# Patient Record
Sex: MALE | Race: WHITE | NOT HISPANIC OR LATINO | Employment: UNEMPLOYED | URBAN - METROPOLITAN AREA
[De-identification: names, ages, dates, MRNs, and addresses within clinical notes are randomized per-mention and may not be internally consistent; named-entity substitution may affect disease eponyms.]

---

## 2017-01-31 ENCOUNTER — OFFICE VISIT (OUTPATIENT)
Dept: INTERNAL MEDICINE | Facility: CLINIC | Age: 52
End: 2017-01-31

## 2017-01-31 ENCOUNTER — HOSPITAL ENCOUNTER (OUTPATIENT)
Dept: NUCLEAR MEDICINE | Facility: HOSPITAL | Age: 52
Discharge: HOME | End: 2017-01-31
Attending: INTERNAL MEDICINE

## 2017-01-31 ENCOUNTER — HOSPITAL ENCOUNTER (OUTPATIENT)
Dept: ULTRASOUND IMAGING | Facility: HOSPITAL | Age: 52
Discharge: HOME | End: 2017-01-31
Attending: INTERNAL MEDICINE | Admitting: INTERNAL MEDICINE

## 2017-01-31 VITALS
DIASTOLIC BLOOD PRESSURE: 86 MMHG | WEIGHT: 224.2 LBS | HEART RATE: 72 BPM | BODY MASS INDEX: 31.39 KG/M2 | SYSTOLIC BLOOD PRESSURE: 118 MMHG | HEIGHT: 71 IN

## 2017-01-31 DIAGNOSIS — R10.11 RIGHT UPPER QUADRANT ABDOMINAL PAIN: ICD-10-CM

## 2017-01-31 DIAGNOSIS — K81.0 ACUTE CHOLECYSTITIS: Primary | ICD-10-CM

## 2017-01-31 DIAGNOSIS — R10.11 ABDOMINAL PAIN, ACUTE, RIGHT UPPER QUADRANT: Primary | ICD-10-CM

## 2017-01-31 DIAGNOSIS — R10.11 RIGHT UPPER QUADRANT ABDOMINAL PAIN: Primary | ICD-10-CM

## 2017-01-31 LAB
ALBUMIN SERPL-MCNC: 4.07 G/DL (ref 3.4–4.6)
ALBUMIN/GLOB SERPL: 1.2 G/DL
ALP SERPL-CCNC: 100 U/L (ref 46–116)
ALT SERPL W P-5'-P-CCNC: 54 U/L (ref 16–63)
ANION GAP SERPL CALCULATED.3IONS-SCNC: 7 MMOL/L
AST SERPL-CCNC: 24 U/L (ref 7–37)
BASOPHILS # BLD AUTO: 0.05 10*3/MM3 (ref 0–0.2)
BASOPHILS NFR BLD AUTO: 0.5 % (ref 0–2)
BILIRUB SERPL-MCNC: 0.5 MG/DL (ref 0.2–1)
BUN BLD-MCNC: 17 MG/DL (ref 6–22)
BUN/CREAT SERPL: 13.2 (ref 7–25)
CALCIUM SPEC-SCNC: 9.3 MG/DL (ref 8.6–10.5)
CHLORIDE SERPL-SCNC: 101 MMOL/L (ref 95–107)
CO2 SERPL-SCNC: 32 MMOL/L (ref 23–32)
CREAT BLD-MCNC: 1.29 MG/DL (ref 0.7–1.3)
DEPRECATED RDW RBC AUTO: 40.5 FL (ref 37–54)
EOSINOPHIL # BLD AUTO: 0.12 10*3/MM3 (ref 0–0.7)
EOSINOPHIL NFR BLD AUTO: 1.3 % (ref 0–5)
ERYTHROCYTE [DISTWIDTH] IN BLOOD BY AUTOMATED COUNT: 12.7 % (ref 11.5–15)
GFR SERPL CREATININE-BSD FRML MDRD: 58 ML/MIN/1.73
GLOBULIN UR ELPH-MCNC: 3.4 GM/DL
GLUCOSE BLD-MCNC: 100 MG/DL (ref 70–100)
HCT VFR BLD AUTO: 49.5 % (ref 40.1–51)
HGB BLD-MCNC: 16.2 G/DL (ref 13.7–17.5)
LIPASE SERPL-CCNC: 23 U/L (ref 13–60)
LYMPHOCYTES # BLD AUTO: 2.95 10*3/MM3 (ref 0.8–7)
LYMPHOCYTES NFR BLD AUTO: 30.9 % (ref 10–60)
MCH RBC QN AUTO: 28.6 PG (ref 26–34)
MCHC RBC AUTO-ENTMCNC: 32.7 G/DL (ref 31–37)
MCV RBC AUTO: 87.5 FL (ref 80–100)
MONOCYTES # BLD AUTO: 0.83 10*3/MM3 (ref 0–1)
MONOCYTES NFR BLD AUTO: 8.7 % (ref 0–13)
NEUTROPHILS # BLD AUTO: 5.59 10*3/MM3 (ref 1–11)
NEUTROPHILS NFR BLD AUTO: 58.6 % (ref 30–85)
PLATELET # BLD AUTO: 389 10*3/MM3 (ref 150–450)
PMV BLD AUTO: 9 FL (ref 6–12)
POTASSIUM BLD-SCNC: 5.3 MMOL/L (ref 3.3–5.3)
PROT SERPL-MCNC: 7.5 G/DL (ref 6.3–8.4)
RBC # BLD AUTO: 5.66 10*6/MM3 (ref 4.63–6.08)
SODIUM BLD-SCNC: 140 MMOL/L (ref 136–145)
WBC NRBC COR # BLD: 9.54 10*3/MM3 (ref 5–10)

## 2017-01-31 PROCEDURE — 25010000002 SINCALIDE PER 5 MCG: Performed by: INTERNAL MEDICINE

## 2017-01-31 PROCEDURE — A9537 TC99M MEBROFENIN: HCPCS | Performed by: INTERNAL MEDICINE

## 2017-01-31 PROCEDURE — 0 TECHNETIUM TC 99M MEBROFENIN KIT: Performed by: INTERNAL MEDICINE

## 2017-01-31 PROCEDURE — 80053 COMPREHEN METABOLIC PANEL: CPT | Performed by: INTERNAL MEDICINE

## 2017-01-31 PROCEDURE — 78227 HEPATOBIL SYST IMAGE W/DRUG: CPT

## 2017-01-31 PROCEDURE — 36415 COLL VENOUS BLD VENIPUNCTURE: CPT | Performed by: INTERNAL MEDICINE

## 2017-01-31 PROCEDURE — 85025 COMPLETE CBC W/AUTO DIFF WBC: CPT | Performed by: INTERNAL MEDICINE

## 2017-01-31 PROCEDURE — 99213 OFFICE O/P EST LOW 20 MIN: CPT | Performed by: INTERNAL MEDICINE

## 2017-01-31 PROCEDURE — 76700 US EXAM ABDOM COMPLETE: CPT

## 2017-01-31 RX ORDER — KIT FOR THE PREPARATION OF TECHNETIUM TC 99M MEBROFENIN 45 MG/10ML
1 INJECTION, POWDER, LYOPHILIZED, FOR SOLUTION INTRAVENOUS
Status: COMPLETED | OUTPATIENT
Start: 2017-01-31 | End: 2017-01-31

## 2017-01-31 RX ADMIN — SINCALIDE 2 MCG: 5 INJECTION, POWDER, LYOPHILIZED, FOR SOLUTION INTRAVENOUS at 14:30

## 2017-01-31 RX ADMIN — MEBROFENIN 1 DOSE: 45 INJECTION, POWDER, LYOPHILIZED, FOR SOLUTION INTRAVENOUS at 13:30

## 2017-01-31 NOTE — MR AVS SNAPSHOT
Walter Fish   2017 8:40 AM   Office Visit    Dept Phone:  [phone]   Encounter #:  [id number]    Provider:  Gui Bloom Jr., MD   Department:  [facility] INTERNAL MEDICINE                Your Full Care Plan              Your Updated Medication List          This list is accurate as of: 17  8:59 AM.  Always use your most recent med list.                amLODIPine 2.5 MG tablet   Commonly known as:  NORVASC   Take 1 tablet by mouth daily.       lansoprazole 30 MG capsule   Commonly known as:  PREVACID   Take 1 capsule by mouth daily.       LORazepam 2 MG tablet   Commonly known as:  ATIVAN   Take 1 tablet by mouth 2 (Two) Times a Day.       losartan 100 MG tablet   Commonly known as:  COZAAR   Take 1 tablet by mouth daily.               We Performed the Following     US Abdomen Complete       You Were Diagnosed With        Codes Comments    Abdominal pain, acute, right upper quadrant    -  Primary ICD-10-CM: R10.11  ICD-9-CM: 789.01, 338.19       Instructions     None    Patient Instructions History      Upcoming Appointments     Visit Type Date Time Department    OFFICE VISIT 2017  8:40 AM [facility]    OFFICE VISIT 2017  9:20 AM [facility]    OFFICE VISIT 2017  8:40 AM [email or web address] Signup     [facility] [employer] allows you to send messages to your doctor, view your test results, renew your prescriptions, schedule appointments, and more. To sign up, go to [email or web address] and click on the Sign Up Now link in the New User? box. Enter your [employer] Activation Code exactly as it appears below along with the last four digits of your Social Security Number and your Date of Birth () to complete the sign-up process. If you do not sign up before the expiration date, you must request a new code.    [employer] Activation Code: [email or web address]  Expires: 2017  8:59 AM    If you have questions, you can email  [email or web address] Messenger or call [phone] to talk to our MyChart staff. Remember, MyChart is NOT to be used for urgent needs. For medical emergencies, dial 911.               Other Info from Your Visit           Your Appointments     Feb 08, 2017  9:20 AM EST   Office Visit with Gui Bloom Jr., MD   [facility] INTERNAL MEDICINE (--)    [address] [place] [id number] [phone]           Arrive 15 minutes prior to appointment.            Feb 24, 2017  8:40 AM EST   Office Visit with Gui Bloom Jr., MD   [facility] INTERNAL MEDICINE (--)    [address] [place] [id number] [phone]           Arrive 15 minutes prior to appointment.              Allergies     No Known Allergies      Reason for Visit     Back Pain x 2 wks    Abdominal Pain x 2 days      Vital Signs     Blood Pressure Pulse Height Weight Body Mass Index Smoking Status    118/86 72 71\" (180.3 cm) 224 lb 3.2 oz (102 kg) 31.27 kg/m2 Never Smoker      Problems and Diagnoses Noted     Abdominal pain, acute, right upper quadrant        "

## 2017-01-31 NOTE — PROGRESS NOTES
Subjective   Walter Fish is a 52 y.o. male.     History of Present Illness he is here today for the evaluation of upper abdominal pain which began 3 nights ago.  He awoke from sleep with upper abdominal pain with radiation to the right upper quadrant.  He has had intermittent pain since. There was no associated nausea or vomiting.  He has had some constipation recently though he did have for some loose stools the day after onset of symptoms.  There has been no melanotic stool or rectal bleeding.  He does relate that he has been using Pepcid AC more frequently over the last 6 months for epigastric area discomfort.  Presently he is on omeprazole for GERD.        Review of Systems   Constitutional: Negative for activity change, appetite change and fever.   HENT: Negative for trouble swallowing.    Respiratory: Negative for cough, chest tightness, shortness of breath and wheezing.    Cardiovascular: Negative for chest pain, palpitations and leg swelling.   Gastrointestinal: Positive for abdominal pain, constipation and diarrhea. Negative for blood in stool, nausea and vomiting.   Genitourinary: Negative for dysuria, flank pain and hematuria.   Musculoskeletal: Positive for back pain. Negative for arthralgias and gait problem.   Neurological: Negative for dizziness, weakness and headaches.       Objective   Physical Exam   Constitutional: He is oriented to person, place, and time. Vital signs are normal. He appears well-developed and well-nourished. He is active. He does not appear ill.   Eyes: Conjunctivae are normal.   Cardiovascular: Normal rate, regular rhythm, S1 normal and S2 normal.  Exam reveals no S3 and no S4.    No murmur heard.  Pulmonary/Chest: No tachypnea. No respiratory distress. He has no decreased breath sounds. He has no wheezes. He has no rhonchi. He has no rales.   Abdominal: Soft. Normal appearance and bowel sounds are normal. He exhibits no abdominal bruit and no mass. There is no  hepatosplenomegaly. There is no tenderness.   Musculoskeletal:        Arms:  Neurological: He is alert and oriented to person, place, and time. Gait normal.   Psychiatric: He has a normal mood and affect. His speech is normal and behavior is normal. Judgment and thought content normal. Cognition and memory are normal.       Assessment/Plan assessment #1 acute abdominal pain-question acute cholecystitis versus peptic.  This was discussed with the patient.    Plan #1 gallbladder ultrasound now #2 CBC, CMP, lipase now.

## 2017-02-02 DIAGNOSIS — K81.9 CHOLECYSTITIS: Primary | ICD-10-CM

## 2017-02-03 ENCOUNTER — ANESTHESIA (OUTPATIENT)
Dept: PERIOP | Facility: HOSPITAL | Age: 52
End: 2017-02-03

## 2017-02-03 ENCOUNTER — HOSPITAL ENCOUNTER (OUTPATIENT)
Facility: HOSPITAL | Age: 52
Setting detail: HOSPITAL OUTPATIENT SURGERY
Discharge: HOME | End: 2017-02-03
Attending: SURGERY | Admitting: SURGERY

## 2017-02-03 ENCOUNTER — ANESTHESIA EVENT (OUTPATIENT)
Dept: PERIOP | Facility: HOSPITAL | Age: 52
End: 2017-02-03

## 2017-02-03 VITALS
TEMPERATURE: 98 F | DIASTOLIC BLOOD PRESSURE: 87 MMHG | OXYGEN SATURATION: 100 % | HEART RATE: 74 BPM | SYSTOLIC BLOOD PRESSURE: 131 MMHG | RESPIRATION RATE: 16 BRPM

## 2017-02-03 DIAGNOSIS — K81.9 CHOLECYSTITIS: ICD-10-CM

## 2017-02-03 PROBLEM — R10.11 ABDOMINAL PAIN, ACUTE, RIGHT UPPER QUADRANT: Status: RESOLVED | Noted: 2017-01-31 | Resolved: 2017-02-03

## 2017-02-03 RX ORDER — SODIUM CHLORIDE 0.9 % (FLUSH) 0.9 %
1-10 SYRINGE (ML) INJECTION AS NEEDED
Status: DISCONTINUED | OUTPATIENT
Start: 2017-02-03 | End: 2017-02-03 | Stop reason: HOSPADM

## 2017-02-03 RX ORDER — METOCLOPRAMIDE HYDROCHLORIDE 5 MG/ML
10 INJECTION INTRAMUSCULAR; INTRAVENOUS ONCE
Status: COMPLETED | OUTPATIENT
Start: 2017-02-03 | End: 2017-02-03

## 2017-02-03 RX ORDER — PROMETHAZINE HYDROCHLORIDE 25 MG/1
25 SUPPOSITORY RECTAL ONCE AS NEEDED
Status: COMPLETED | OUTPATIENT
Start: 2017-02-03 | End: 2017-02-03

## 2017-02-03 RX ORDER — OXYCODONE HYDROCHLORIDE AND ACETAMINOPHEN 5; 325 MG/1; MG/1
2 TABLET ORAL ONCE AS NEEDED
Status: DISCONTINUED | OUTPATIENT
Start: 2017-02-03 | End: 2017-02-03 | Stop reason: HOSPADM

## 2017-02-03 RX ORDER — FENTANYL CITRATE 50 UG/ML
INJECTION, SOLUTION INTRAMUSCULAR; INTRAVENOUS AS NEEDED
Status: DISCONTINUED | OUTPATIENT
Start: 2017-02-03 | End: 2017-02-03 | Stop reason: SURG

## 2017-02-03 RX ORDER — NALBUPHINE HCL 10 MG/ML
2 AMPUL (ML) INJECTION EVERY 4 HOURS PRN
Status: DISCONTINUED | OUTPATIENT
Start: 2017-02-03 | End: 2017-02-03 | Stop reason: HOSPADM

## 2017-02-03 RX ORDER — LABETALOL HYDROCHLORIDE 5 MG/ML
5 INJECTION, SOLUTION INTRAVENOUS
Status: DISCONTINUED | OUTPATIENT
Start: 2017-02-03 | End: 2017-02-03 | Stop reason: HOSPADM

## 2017-02-03 RX ORDER — KETOROLAC TROMETHAMINE 30 MG/ML
30 INJECTION, SOLUTION INTRAMUSCULAR; INTRAVENOUS ONCE
Status: COMPLETED | OUTPATIENT
Start: 2017-02-03 | End: 2017-02-03

## 2017-02-03 RX ORDER — FENTANYL CITRATE 50 UG/ML
50 INJECTION, SOLUTION INTRAMUSCULAR; INTRAVENOUS
Status: DISCONTINUED | OUTPATIENT
Start: 2017-02-03 | End: 2017-02-03 | Stop reason: HOSPADM

## 2017-02-03 RX ORDER — OXYCODONE HYDROCHLORIDE AND ACETAMINOPHEN 5; 325 MG/1; MG/1
2 TABLET ORAL EVERY 4 HOURS PRN
Qty: 30 TABLET | Refills: 0 | Status: SHIPPED | OUTPATIENT
Start: 2017-02-03 | End: 2017-02-24

## 2017-02-03 RX ORDER — MAGNESIUM HYDROXIDE 1200 MG/15ML
LIQUID ORAL AS NEEDED
Status: DISCONTINUED | OUTPATIENT
Start: 2017-02-03 | End: 2017-02-03 | Stop reason: HOSPADM

## 2017-02-03 RX ORDER — DIPHENHYDRAMINE HYDROCHLORIDE 50 MG/ML
6.25 INJECTION INTRAMUSCULAR; INTRAVENOUS
Status: DISCONTINUED | OUTPATIENT
Start: 2017-02-03 | End: 2017-02-03 | Stop reason: HOSPADM

## 2017-02-03 RX ORDER — PROMETHAZINE HYDROCHLORIDE 25 MG/ML
6.25 INJECTION, SOLUTION INTRAMUSCULAR; INTRAVENOUS ONCE AS NEEDED
Status: COMPLETED | OUTPATIENT
Start: 2017-02-03 | End: 2017-02-03

## 2017-02-03 RX ORDER — FAMOTIDINE 10 MG/ML
20 INJECTION, SOLUTION INTRAVENOUS ONCE
Status: COMPLETED | OUTPATIENT
Start: 2017-02-03 | End: 2017-02-03

## 2017-02-03 RX ORDER — ONDANSETRON 2 MG/ML
4 INJECTION INTRAMUSCULAR; INTRAVENOUS ONCE AS NEEDED
Status: DISCONTINUED | OUTPATIENT
Start: 2017-02-03 | End: 2017-02-03 | Stop reason: HOSPADM

## 2017-02-03 RX ORDER — HYDRALAZINE HYDROCHLORIDE 20 MG/ML
5 INJECTION INTRAMUSCULAR; INTRAVENOUS
Status: DISCONTINUED | OUTPATIENT
Start: 2017-02-03 | End: 2017-02-03 | Stop reason: HOSPADM

## 2017-02-03 RX ORDER — MIDAZOLAM HYDROCHLORIDE 1 MG/ML
2 INJECTION INTRAMUSCULAR; INTRAVENOUS
Status: DISCONTINUED | OUTPATIENT
Start: 2017-02-03 | End: 2017-02-03 | Stop reason: HOSPADM

## 2017-02-03 RX ORDER — BUPIVACAINE HYDROCHLORIDE AND EPINEPHRINE 2.5; 5 MG/ML; UG/ML
INJECTION, SOLUTION INFILTRATION; PERINEURAL AS NEEDED
Status: DISCONTINUED | OUTPATIENT
Start: 2017-02-03 | End: 2017-02-03 | Stop reason: HOSPADM

## 2017-02-03 RX ORDER — ONDANSETRON 2 MG/ML
INJECTION INTRAMUSCULAR; INTRAVENOUS AS NEEDED
Status: DISCONTINUED | OUTPATIENT
Start: 2017-02-03 | End: 2017-02-03 | Stop reason: SURG

## 2017-02-03 RX ORDER — LIDOCAINE HYDROCHLORIDE 20 MG/ML
INJECTION, SOLUTION INFILTRATION; PERINEURAL AS NEEDED
Status: DISCONTINUED | OUTPATIENT
Start: 2017-02-03 | End: 2017-02-03 | Stop reason: SURG

## 2017-02-03 RX ORDER — SODIUM CHLORIDE, SODIUM LACTATE, POTASSIUM CHLORIDE, CALCIUM CHLORIDE 600; 310; 30; 20 MG/100ML; MG/100ML; MG/100ML; MG/100ML
9 INJECTION, SOLUTION INTRAVENOUS CONTINUOUS
Status: DISCONTINUED | OUTPATIENT
Start: 2017-02-03 | End: 2017-02-03 | Stop reason: HOSPADM

## 2017-02-03 RX ORDER — GLYCOPYRROLATE 0.2 MG/ML
INJECTION INTRAMUSCULAR; INTRAVENOUS AS NEEDED
Status: DISCONTINUED | OUTPATIENT
Start: 2017-02-03 | End: 2017-02-03 | Stop reason: SURG

## 2017-02-03 RX ORDER — HYDROMORPHONE HYDROCHLORIDE 1 MG/ML
0.5 INJECTION, SOLUTION INTRAMUSCULAR; INTRAVENOUS; SUBCUTANEOUS
Status: DISCONTINUED | OUTPATIENT
Start: 2017-02-03 | End: 2017-02-03 | Stop reason: HOSPADM

## 2017-02-03 RX ORDER — PROMETHAZINE HYDROCHLORIDE 25 MG/1
25 TABLET ORAL ONCE AS NEEDED
Status: COMPLETED | OUTPATIENT
Start: 2017-02-03 | End: 2017-02-03

## 2017-02-03 RX ORDER — NALOXONE HCL 0.4 MG/ML
0.4 VIAL (ML) INJECTION AS NEEDED
Status: DISCONTINUED | OUTPATIENT
Start: 2017-02-03 | End: 2017-02-03 | Stop reason: HOSPADM

## 2017-02-03 RX ORDER — PROPOFOL 10 MG/ML
VIAL (ML) INTRAVENOUS AS NEEDED
Status: DISCONTINUED | OUTPATIENT
Start: 2017-02-03 | End: 2017-02-03 | Stop reason: SURG

## 2017-02-03 RX ORDER — NALBUPHINE HCL 10 MG/ML
10 AMPUL (ML) INJECTION EVERY 4 HOURS PRN
Status: DISCONTINUED | OUTPATIENT
Start: 2017-02-03 | End: 2017-02-03 | Stop reason: HOSPADM

## 2017-02-03 RX ORDER — ACETAMINOPHEN 325 MG/1
650 TABLET ORAL ONCE
Status: COMPLETED | OUTPATIENT
Start: 2017-02-03 | End: 2017-02-03

## 2017-02-03 RX ORDER — DIPHENHYDRAMINE HYDROCHLORIDE 50 MG/ML
12.5 INJECTION INTRAMUSCULAR; INTRAVENOUS
Status: DISCONTINUED | OUTPATIENT
Start: 2017-02-03 | End: 2017-02-03 | Stop reason: HOSPADM

## 2017-02-03 RX ORDER — NEOSTIGMINE METHYLSULFATE 1 MG/ML
INJECTION, SOLUTION INTRAVENOUS AS NEEDED
Status: DISCONTINUED | OUTPATIENT
Start: 2017-02-03 | End: 2017-02-03 | Stop reason: SURG

## 2017-02-03 RX ORDER — HYDROCODONE BITARTRATE AND ACETAMINOPHEN 5; 325 MG/1; MG/1
1 TABLET ORAL ONCE AS NEEDED
Status: DISCONTINUED | OUTPATIENT
Start: 2017-02-03 | End: 2017-02-03 | Stop reason: HOSPADM

## 2017-02-03 RX ORDER — SODIUM CHLORIDE 9 MG/ML
INJECTION, SOLUTION INTRAVENOUS AS NEEDED
Status: DISCONTINUED | OUTPATIENT
Start: 2017-02-03 | End: 2017-02-03 | Stop reason: HOSPADM

## 2017-02-03 RX ORDER — MIDAZOLAM HYDROCHLORIDE 1 MG/ML
1 INJECTION INTRAMUSCULAR; INTRAVENOUS
Status: DISCONTINUED | OUTPATIENT
Start: 2017-02-03 | End: 2017-02-03 | Stop reason: HOSPADM

## 2017-02-03 RX ORDER — ROCURONIUM BROMIDE 10 MG/ML
INJECTION, SOLUTION INTRAVENOUS AS NEEDED
Status: DISCONTINUED | OUTPATIENT
Start: 2017-02-03 | End: 2017-02-03 | Stop reason: SURG

## 2017-02-03 RX ORDER — PROMETHAZINE HCL 50 MG
25 TABLET ORAL EVERY 6 HOURS PRN
Qty: 12 TABLET | Refills: 0 | Status: SHIPPED | OUTPATIENT
Start: 2017-02-03 | End: 2017-02-24

## 2017-02-03 RX ORDER — LIDOCAINE HYDROCHLORIDE 10 MG/ML
5 INJECTION, SOLUTION EPIDURAL; INFILTRATION; INTRACAUDAL; PERINEURAL ONCE
Status: DISCONTINUED | OUTPATIENT
Start: 2017-02-03 | End: 2017-02-03 | Stop reason: HOSPADM

## 2017-02-03 RX ORDER — KETOROLAC TROMETHAMINE 30 MG/ML
INJECTION, SOLUTION INTRAMUSCULAR; INTRAVENOUS AS NEEDED
Status: DISCONTINUED | OUTPATIENT
Start: 2017-02-03 | End: 2017-02-03 | Stop reason: SURG

## 2017-02-03 RX ADMIN — HYDROCODONE BITARTRATE AND ACETAMINOPHEN 1 TABLET: 5; 325 TABLET ORAL at 14:02

## 2017-02-03 RX ADMIN — GLYCOPYRROLATE 0.6 MG: 0.2 INJECTION INTRAMUSCULAR; INTRAVENOUS at 13:13

## 2017-02-03 RX ADMIN — MIDAZOLAM 2 MG: 1 INJECTION INTRAMUSCULAR; INTRAVENOUS at 11:52

## 2017-02-03 RX ADMIN — FENTANYL CITRATE 50 MCG: 50 INJECTION INTRAMUSCULAR; INTRAVENOUS at 12:48

## 2017-02-03 RX ADMIN — LIDOCAINE HYDROCHLORIDE 60 MG: 20 INJECTION, SOLUTION INFILTRATION; PERINEURAL at 12:49

## 2017-02-03 RX ADMIN — FENTANYL CITRATE 50 MCG: 50 INJECTION INTRAMUSCULAR; INTRAVENOUS at 13:06

## 2017-02-03 RX ADMIN — SODIUM CHLORIDE, POTASSIUM CHLORIDE, SODIUM LACTATE AND CALCIUM CHLORIDE 9 ML/HR: 600; 310; 30; 20 INJECTION, SOLUTION INTRAVENOUS at 11:32

## 2017-02-03 RX ADMIN — ACETAMINOPHEN 650 MG: 325 TABLET ORAL at 11:50

## 2017-02-03 RX ADMIN — FENTANYL CITRATE 50 MCG: 50 INJECTION INTRAMUSCULAR; INTRAVENOUS at 13:09

## 2017-02-03 RX ADMIN — METOCLOPRAMIDE 10 MG: 5 INJECTION, SOLUTION INTRAMUSCULAR; INTRAVENOUS at 14:01

## 2017-02-03 RX ADMIN — KETOROLAC TROMETHAMINE 30 MG: 30 INJECTION, SOLUTION INTRAMUSCULAR; INTRAVENOUS at 13:05

## 2017-02-03 RX ADMIN — FENTANYL CITRATE 50 MCG: 50 INJECTION INTRAMUSCULAR; INTRAVENOUS at 13:18

## 2017-02-03 RX ADMIN — ONDANSETRON 4 MG: 2 INJECTION INTRAMUSCULAR; INTRAVENOUS at 13:05

## 2017-02-03 RX ADMIN — SODIUM CHLORIDE, POTASSIUM CHLORIDE, SODIUM LACTATE AND CALCIUM CHLORIDE: 600; 310; 30; 20 INJECTION, SOLUTION INTRAVENOUS at 13:08

## 2017-02-03 RX ADMIN — PROPOFOL 200 MG: 10 INJECTION, EMULSION INTRAVENOUS at 12:49

## 2017-02-03 RX ADMIN — FAMOTIDINE 20 MG: 10 INJECTION, SOLUTION INTRAVENOUS at 11:52

## 2017-02-03 RX ADMIN — KETOROLAC TROMETHAMINE 30 MG: 30 INJECTION, SOLUTION INTRAMUSCULAR at 14:02

## 2017-02-03 RX ADMIN — NEOSTIGMINE METHYLSULFATE 4 MG: 1 INJECTION INTRAVENOUS at 13:13

## 2017-02-03 RX ADMIN — PROMETHAZINE HYDROCHLORIDE 25 MG: 25 TABLET ORAL at 14:48

## 2017-02-03 RX ADMIN — ROCURONIUM BROMIDE 25 MG: 10 INJECTION INTRAVENOUS at 12:49

## 2017-02-03 NOTE — OP NOTE
February 3, 2017    Walter Fish  [id number]    PROCEDURE:  Laparoscopic cholecystectomy.    PREOPERATIVE DIAGNOSIS:  chronic choleyctitis with bilary dyskinesia.    POSTOPERATIVE DIAGNOSIS:  same.     SURGEON:  Dakota Mckeon M.D.    ASSISTANT:  None.    ANESTHESIA:  GETA.    FINDINGS:  Moderate pericholecystic adhesions consisting of omentum densely attached about one half way up the gallbladder from West's Pouch.    INDICATIONS:  Patient presents today with a recently diagnosed chronic choleyctitis with bilary dyskinesia. After seeing the patient and reviewing the studies, I recommended consideration for laparoscopic cholecystectomy .  The procedure, risks, benefits and alternatives were discussed including but not limited to bleeding, infection, opening, internal organ injury as well as the need for further surgery acutely or chronically down the line.  They expressed agreement and understanding.  Questions were answered to their satisfaction.    PROCEDURE:  Patient was taken to the operating room and placed supine on the operating room table. After establishment of adequate general endotracheal anesthesia, the abdomen was prepped and draped in normal sterile fashion.  The procedure was then initiated by making a 5 mm transverse infraumbilical incision through which a bladeless was trocar was inserted into the abdominal cavity.  After insufflation a camera was inserted and the abdomen cavity was inspected.  With no contraindications, I then placed under direct vision 3 subcostal trochars, an 11 mm subxiphoid trocar, and 2 lateral 5 mm trochars. Two blunt gallbladder graspers were then inserted and the gallbladder was dissected free and elevated.  I grabbed the fundus first and retracted superiorly and the West's pouch and retracted laterally.  This opened up the triangle of Calot.  Both blunt and sharp dissection were then used to define the length of the gallbladder, the West's pouch and the  triangle of Calot.  Once in the triangle, I then dissected out the cystic duct and artery.  Both were identified, completely cleared, clipped and divided.  With both structures divided and no other structures identified, and the anatomic relationships confirmed, I then began removing the gallbladder from its hepatic attachments using cautery and countertraction.  Once the gallbladder was completely  from the liver bed, it was then removed through the subxiphoid port without much difficulty.  The right upper quadrant was then irrigated with saline and inspected.  Once no evidence of continued bleeding or signs of bile leak were noted, instruments and trocars were removed and counted correctly.  Local anesthetic solution was injected into all the incisions which were then closed with 4-0 Vicryl subcuticular suture and then covered by steristrips and Band-Aids.  Anesthesia was reversed and the patient was taken recovery in satisfactory condition.    Dakota Mckeon M.D.

## 2017-02-03 NOTE — PLAN OF CARE
Problem: Patient Care Overview (Adult)  Goal: Plan of Care Review  Outcome: Ongoing (interventions implemented as appropriate)    02/03/17 1343   Coping/Psychosocial Response Interventions   Plan Of Care Reviewed With patient   Patient Care Overview   Progress improving

## 2017-02-03 NOTE — PLAN OF CARE
Problem: Patient Care Overview (Adult)  Goal: Plan of Care Review  Outcome: Ongoing (interventions implemented as appropriate)    02/03/17 1109   Coping/Psychosocial Response Interventions   Plan Of Care Reviewed With patient       Goal: Discharge Needs Assessment  Outcome: Ongoing (interventions implemented as appropriate)    02/03/17 1109   Discharge Needs Assessment   Concerns To Be Addressed denies needs/concerns at this time

## 2017-02-03 NOTE — ANESTHESIA PREPROCEDURE EVALUATION
Anesthesia Evaluation      Airway   Mallampati: II  TM distance: >3 FB  Neck ROM: full  no difficulty expected  Dental - normal exam     Pulmonary - negative pulmonary ROS and normal exam   Cardiovascular   (+) hypertension,     Rhythm: regular    Neuro/Psych  (+) psychiatric history,    GI/Hepatic/Renal/Endo    (+)  GERD,     Musculoskeletal     Abdominal    Substance History      OB/GYN          Other                             Anesthesia Plan    ASA 2     general   (  D/W R&B of GA including but not limited to: heart, lung, liver, kidney, neurologic problems, positioning injuries, dental damage, corneal abrasion and TMJ.  .)  intravenous induction

## 2017-02-03 NOTE — ANESTHESIA PROCEDURE NOTES
Airway  Urgency: elective    Date/Time: 2/3/2017 12:53 PM  Airway not difficult    General Information and Staff    Patient location during procedure: OR  Anesthesiologist: KINGA CHOUDHURY  CRNA: OREN PEREZ    Indications and Patient Condition  Indications for airway management: airway protection    Preoxygenated: yes  Mask difficulty assessment: 1 - vent by mask    Final Airway Details  Final airway type: endotracheal airway      Successful airway: ETT  Cuffed: yes   Successful intubation technique: direct laryngoscopy  Facilitating devices/methods: intubating stylet  Endotracheal tube insertion site: oral  Blade: Nadir  Blade size: #4  ETT size: 7.5 mm  Cormack-Lehane Classification: grade IIa - partial view of glottis  Placement verified by: chest auscultation and capnometry   Measured from: lips  ETT to lips (cm): 22  Number of attempts at approach: 1    Additional Comments  Pre 02, sivi,, easy bvm, dlx1, grade 2b view, intubation x 1 attempt, appears atraumatic, teeth unchanged

## 2017-02-03 NOTE — PLAN OF CARE
Problem: Patient Care Overview (Adult)  Goal: Plan of Care Review  Outcome: Outcome(s) achieved Date Met:  02/03/17 02/03/17 9252   Coping/Psychosocial Response Interventions   Plan Of Care Reviewed With patient;spouse   Patient Care Overview   Progress improving

## 2017-02-03 NOTE — PLAN OF CARE
Problem: Perioperative Period (Adult)  Goal: Signs and Symptoms of Listed Potential Problems Will be Absent or Manageable (Perioperative Period)  Outcome: Outcome(s) achieved Date Met:  02/03/17 02/03/17 1438   Perioperative Period   Problems Assessed (Perioperative Period) all   Problems Present (Perioperative Period) none

## 2017-02-03 NOTE — ANESTHESIA POSTPROCEDURE EVALUATION
Patient: Walter Fish    Procedure Summary     Date Anesthesia Start Anesthesia Stop Room / Location    02/03/17 1247 1320  [facility]  /  [facility]       Procedure Diagnosis Surgeon Provider    CHOLECYSTECTOMY LAPAROSCOPIC (N/A Abdomen) Cholecystitis  (Cholecystitis [K81.9]) MD Elieser Willard MD          Anesthesia Type: general  Last vitals  /87 (02/03/17 1430)    Temp 36.7 °C (98 °F) (02/03/17 1400)    Pulse 74 (02/03/17 1430)   Resp 16 (02/03/17 1430)    SpO2 100 % (02/03/17 1430)      Post Anesthesia Care and Evaluation    Patient location during evaluation: bedside  Patient participation: complete - patient participated  Level of consciousness: awake and alert  Pain management: adequate  Airway patency: patent  Anesthetic complications: No anesthetic complications  PONV status: some mild nassea.  Cardiovascular status: acceptable  Respiratory status: acceptable  Hydration status: acceptable

## 2017-02-03 NOTE — PLAN OF CARE
Problem: Perioperative Period (Adult)  Goal: Signs and Symptoms of Listed Potential Problems Will be Absent or Manageable (Perioperative Period)  Outcome: Ongoing (interventions implemented as appropriate)    02/03/17 1109   Perioperative Period   Problems Assessed (Perioperative Period) all

## 2017-02-03 NOTE — PLAN OF CARE
Problem: Perioperative Period (Adult)  Goal: Signs and Symptoms of Listed Potential Problems Will be Absent or Manageable (Perioperative Period)  Outcome: Ongoing (interventions implemented as appropriate)    02/03/17 1344   Perioperative Period   Problems Assessed (Perioperative Period) all   Problems Present (Perioperative Period) pain

## 2017-02-03 NOTE — H&P
Office H & P    LEO TSE  YOB: 1965  DATE OF SERVICE:  02/02/2017        CONSULTING PHYSICIAN:  Dakota Mckeon MD    REQUESTING PHYSICIAN:  Gui Bloom MD    REASON FOR CONSULTATION:  Cholecystitis.    HISTORY:  The patient is a 52-year-old young man, who had a screening colonoscopy by me in the past, and comes in today to discuss a poorly functioning gallbladder.  He thinks he probably had stomach issues going on two years, including epigastric pain after eating certain foods that has become more right upper quadrant pain and now radiates to the back.  Mild reflux has turned into more severe reflux with nausea.  It all seems to be related to eating, but now even not eating he is still uncomfortable.  He denies any change in the color of his urine or stool or blood from above or below.  He does not have any significant relief with reflux medication.  He had an ultrasound of the gallbladder that was negative, but then a HIDA scan was done that showed only an 8% gallbladder function.  He was referred to me.    PAST MEDICAL HISTORY:  1.  High blood pressure.  2.  Seasonal allergies.  3.  Several joint surgeries.  4.  Reflux.    SOCIAL HISTORY:  .  Works for [employer].  Does not drink or smoke.    FAMILY HISTORY:  Gallbladder disease in both parents.    REVIEW OF SYSTEMS:  A 12-point review of systems reveals multiple GI complaints as mentioned above.  Otherwise, all systems negative.    PHYSICAL EXAMINATION:  GENERAL:  Healthy appearing for his age, but slightly uncomfortable appearing young man in no acute distress, alert, oriented, and afebrile.  HEENT:  Head normocephalic.  External ears normal.  Sclerae nonicteric.  NECK:  Supple without thyromegaly.  CHEST:  Clear with good respiratory effort bilaterally.  HEART:  Regular without murmur.  ABDOMEN:  Soft without masses, hernias, distention, or tympany, but he is tender in the right upper quadrant.  EXTREMITIES:  Without cyanosis  or edema.  SKIN:  Negative.  RECTAL:  Deferred.    IMPRESSION:  Chronic cholecystitis with severe biliary dyskinesia.    PLAN:  Laparoscopic cholecystectomy and he would like that as soon as possible, so I will try to get it on for tomorrow and he is agreeable and anxious to proceed.    Letter to Gui Bloom.          GAGAN Stroud: cecily/nancy  02/02/2017 00:00:00  02/03/2017 00:00:00

## 2017-02-03 NOTE — PLAN OF CARE
Problem: Patient Care Overview (Adult)  Goal: Discharge Needs Assessment  Outcome: Outcome(s) achieved Date Met:  02/03/17 02/03/17 1438   Discharge Needs Assessment   Concerns To Be Addressed no discharge needs identified   Discharge Disposition home or self-care

## 2017-02-04 LAB
CYTO UR: NORMAL
LAB AP CASE REPORT: NORMAL
Lab: NORMAL
PATH REPORT.FINAL DX SPEC: NORMAL
PATH REPORT.GROSS SPEC: NORMAL

## 2017-02-22 ENCOUNTER — TELEPHONE (OUTPATIENT)
Dept: INTERNAL MEDICINE | Facility: CLINIC | Age: 52
End: 2017-02-22

## 2017-02-22 NOTE — TELEPHONE ENCOUNTER
----- Message from Africa Abdi MA sent at 2/22/2017  9:04 AM EST -----  Contact: pt      ----- Message -----     From: Damion Serrano     Sent: 2/22/2017   8:28 AM       To: Africa Abdi MA    Pt would like to know if he still needs to come in for his 6 mo follow up on Friday. Pt was in on 1/31/17. Please advise.     #[phone]

## 2017-02-24 ENCOUNTER — OFFICE VISIT (OUTPATIENT)
Dept: INTERNAL MEDICINE | Facility: CLINIC | Age: 52
End: 2017-02-24

## 2017-02-24 VITALS
BODY MASS INDEX: 31.08 KG/M2 | DIASTOLIC BLOOD PRESSURE: 90 MMHG | HEART RATE: 84 BPM | SYSTOLIC BLOOD PRESSURE: 124 MMHG | HEIGHT: 71 IN | WEIGHT: 222 LBS

## 2017-02-24 DIAGNOSIS — I10 ESSENTIAL HYPERTENSION: ICD-10-CM

## 2017-02-24 DIAGNOSIS — E78.00 ELEVATED CHOLESTEROL: ICD-10-CM

## 2017-02-24 DIAGNOSIS — Z90.49 STATUS POST CHOLECYSTECTOMY: Primary | ICD-10-CM

## 2017-02-24 LAB
CHOLEST SERPL-MCNC: 174 MG/DL (ref 0–200)
HDLC SERPL-MCNC: 47 MG/DL (ref 40–81)
LDLC SERPL CALC-MCNC: 115 MG/DL (ref 0–100)
LDLC/HDLC SERPL: 2.45 {RATIO}
TRIGL SERPL-MCNC: 59 MG/DL (ref 0–150)
VLDLC SERPL-MCNC: 11.8 MG/DL

## 2017-02-24 PROCEDURE — 99213 OFFICE O/P EST LOW 20 MIN: CPT | Performed by: INTERNAL MEDICINE

## 2017-02-24 PROCEDURE — 80061 LIPID PANEL: CPT | Performed by: INTERNAL MEDICINE

## 2017-02-24 PROCEDURE — 36415 COLL VENOUS BLD VENIPUNCTURE: CPT | Performed by: INTERNAL MEDICINE

## 2017-03-01 RX ORDER — LOSARTAN POTASSIUM 100 MG/1
100 TABLET ORAL DAILY
Qty: 90 TABLET | Refills: 3 | Status: SHIPPED | OUTPATIENT
Start: 2017-03-01 | End: 2018-02-15 | Stop reason: SDUPTHER

## 2017-03-01 RX ORDER — AMLODIPINE BESYLATE 2.5 MG/1
2.5 TABLET ORAL DAILY
Qty: 90 TABLET | Refills: 3 | Status: SHIPPED | OUTPATIENT
Start: 2017-03-01 | End: 2017-03-21 | Stop reason: DRUGHIGH

## 2017-03-01 RX ORDER — LANSOPRAZOLE 30 MG/1
30 CAPSULE, DELAYED RELEASE ORAL DAILY
Qty: 90 CAPSULE | Refills: 3 | Status: SHIPPED | OUTPATIENT
Start: 2017-03-01 | End: 2018-02-15 | Stop reason: SDUPTHER

## 2017-03-02 DIAGNOSIS — F41.9 ANXIETY: Primary | ICD-10-CM

## 2017-03-02 RX ORDER — LORAZEPAM 2 MG/1
2 TABLET ORAL 2 TIMES DAILY
Qty: 180 TABLET | Refills: 0 | OUTPATIENT
Start: 2017-03-02

## 2017-03-02 NOTE — TELEPHONE ENCOUNTER
----- Message from Aicha Jack sent at 3/2/2017 10:03 AM EST -----  Contact: pts wife Aysha  Pt wife calling for refill on LORazepam (ATIVAN) 2 MG tablet 180 tablet      Sig - Route: Take 1 tablet by mouth 2 (Two) Times a Day. - Oral  Please send [place] [facility] [id number] [place] [address] [facility] [phone] [facility] [phone] FX

## 2017-03-14 ENCOUNTER — TELEPHONE (OUTPATIENT)
Dept: INTERNAL MEDICINE | Facility: CLINIC | Age: 52
End: 2017-03-14

## 2017-03-14 RX ORDER — LORAZEPAM 2 MG/1
2 TABLET ORAL 2 TIMES DAILY
Qty: 180 TABLET | Refills: 0 | OUTPATIENT
Start: 2017-03-14 | End: 2017-07-11 | Stop reason: SDUPTHER

## 2017-03-14 NOTE — TELEPHONE ENCOUNTER
----- Message from Walter Salazarevelia sent at 3/13/2017  9:52 PM EDT -----  Regarding: Prescription Question  Contact: [phone]  My blood pressure spiked yesterday to 140/100.  I am not sure why.  I had my Healthcare screening for work Saturday and I was at 121/80.  I started to feel dizzy today and I felt off balance.  I took 2 extra amlopidine  tabs tonight to help bring me back in line.  I have been under a lot of stress for a while but I haven't felt like this.  I am feeling some better since taking the extra medicine.  I was a little scared after watching mom go through her stroke.  Please let me know if I should increase my meds or come in to see you.      Thanks for all you do.    Regards,        Cole Fish

## 2017-03-14 NOTE — TELEPHONE ENCOUNTER
----- Message from Walter Fish sent at 3/13/2017  9:54 PM EDT -----  Regarding: Prescription Question  Contact: [phone]  Please call my ativan prescription into [facility] this week.  It is for 90 days at 2 MG twice daily.      Regards,      Cole Fish

## 2017-03-20 ENCOUNTER — OFFICE VISIT (OUTPATIENT)
Dept: INTERNAL MEDICINE | Facility: CLINIC | Age: 52
End: 2017-03-20

## 2017-03-20 VITALS
HEIGHT: 71 IN | WEIGHT: 225 LBS | SYSTOLIC BLOOD PRESSURE: 134 MMHG | DIASTOLIC BLOOD PRESSURE: 90 MMHG | BODY MASS INDEX: 31.5 KG/M2

## 2017-03-20 DIAGNOSIS — I10 ESSENTIAL HYPERTENSION: Primary | ICD-10-CM

## 2017-03-20 PROCEDURE — 99213 OFFICE O/P EST LOW 20 MIN: CPT | Performed by: INTERNAL MEDICINE

## 2017-03-20 RX ORDER — METOPROLOL SUCCINATE 50 MG/1
50 TABLET, EXTENDED RELEASE ORAL DAILY
Qty: 90 TABLET | Refills: 3 | Status: SHIPPED | OUTPATIENT
Start: 2017-03-20 | End: 2017-09-08

## 2017-03-20 NOTE — PROGRESS NOTES
Subjective   Walter Fish is a 52 y.o. male.     History of Present Illness he is here today for recent increase in bp which had previously been controlled. Last week he began to notice bp 130-140/100. He has increased his norvasc to 10mg per day.        Review of Systems   Constitutional: Negative for activity change, appetite change, fatigue and fever.   Respiratory: Negative for cough, chest tightness and wheezing.    Cardiovascular: Negative for chest pain and palpitations.   Gastrointestinal: Negative for abdominal pain, diarrhea, nausea and vomiting.   Neurological: Positive for dizziness and headaches. Negative for speech difficulty, weakness and numbness.   Psychiatric/Behavioral: Negative for confusion and dysphoric mood. The patient is not nervous/anxious.        Objective   Physical Exam   Constitutional: He is oriented to person, place, and time. He appears well-developed and well-nourished. He is active. He does not appear ill.   Eyes: Conjunctivae are normal.   Cardiovascular: Normal rate, regular rhythm, S1 normal and S2 normal.  Exam reveals no S3 and no S4.    No murmur heard.  Pulmonary/Chest: No tachypnea. No respiratory distress. He has no decreased breath sounds. He has no wheezes. He has no rhonchi. He has no rales.   Abdominal: Soft. Normal appearance and bowel sounds are normal. He exhibits no abdominal bruit and no mass. There is no hepatosplenomegaly. There is no tenderness.       Vascular Status -  His exam exhibits no right foot edema. His exam exhibits no left foot edema.  Neurological: He is alert and oriented to person, place, and time.   Psychiatric: He has a normal mood and affect. His speech is normal and behavior is normal. Judgment and thought content normal. Cognition and memory are normal.       Assessment/Plan bp 122/99    Assessment #1 hypertension- difficult to explain sudden poorer control     Plan#1 continue losartan 100mg qd, norvasc 10mg qd. #2 begin metoprolol 50mg qd.   Fu with me in one month.

## 2017-03-21 ENCOUNTER — TELEPHONE (OUTPATIENT)
Dept: INTERNAL MEDICINE | Facility: CLINIC | Age: 52
End: 2017-03-21

## 2017-03-21 RX ORDER — AMLODIPINE BESYLATE 10 MG/1
10 TABLET ORAL DAILY
Qty: 90 TABLET | Refills: 3 | Status: SHIPPED | OUTPATIENT
Start: 2017-03-21 | End: 2017-04-21 | Stop reason: DRUGHIGH

## 2017-03-21 NOTE — TELEPHONE ENCOUNTER
Dr. Bloom,    Mr. Fish sent a My Chart message (see below) stating that he was to have had a rx for  One (1) Amlodipine 10 mg sent in to his local pharmacy.    I see where there was a 90 day supply w/ 3 additional refills of Amlodipine 2.5 mg sent in on 3/1/17 which is what I had informed him.    Please advise.    Thank you,

## 2017-03-21 NOTE — TELEPHONE ENCOUNTER
----- Message from Walter Fish sent at 3/21/2017  9:28 AM EDT -----  Regarding: RE: Prescription Question  Contact: [phone]  He said he was calling in 1 10mg tablet.  Did yhat change?  ----- Message -----  From: JENS RIVERA  Sent: 3/21/2017  8:31 AM EDT  To: Walter Fish  Subject: RE: Prescription Question    Mr. Fish,    A refill on your Amlodipine 2.5 mg was sent to your [facility] pharmacy in [place] on 3/1/17 for 90 tablets with 3 additional refills. Please contact your pharmacy to see if they have refill ready for .    Thank you,    Malik DE LA ROSA     [facility]      ----- Message -----     From: Walter Fish     Sent: 3/20/2017  7:10 PM EDT       To: Gui Bloom Jr., MD  Subject: Prescription Question    Please call on my 10 MG amlopidine.  I am running out of 2.5 mg.    Regards,      Cole Fish

## 2017-04-21 ENCOUNTER — OFFICE VISIT (OUTPATIENT)
Dept: INTERNAL MEDICINE | Facility: CLINIC | Age: 52
End: 2017-04-21

## 2017-04-21 VITALS
SYSTOLIC BLOOD PRESSURE: 136 MMHG | WEIGHT: 226 LBS | DIASTOLIC BLOOD PRESSURE: 90 MMHG | HEIGHT: 71 IN | BODY MASS INDEX: 31.64 KG/M2

## 2017-04-21 DIAGNOSIS — E78.00 ELEVATED CHOLESTEROL: Primary | ICD-10-CM

## 2017-04-21 DIAGNOSIS — I10 ESSENTIAL HYPERTENSION: ICD-10-CM

## 2017-04-21 PROCEDURE — 99213 OFFICE O/P EST LOW 20 MIN: CPT | Performed by: INTERNAL MEDICINE

## 2017-04-21 RX ORDER — AMLODIPINE BESYLATE 2.5 MG/1
2.5 TABLET ORAL DAILY
Qty: 90 TABLET | Refills: 3 | Status: SHIPPED | OUTPATIENT
Start: 2017-04-21 | End: 2017-07-10 | Stop reason: SDUPTHER

## 2017-04-21 NOTE — PROGRESS NOTES
Subjective   Walter Fish is a 52 y.o. male.     History of Present Illness he is here today for follow-up of hypertension.  Not unexpectedly he found that the amount of antihypertensives medication he was taking to cover his sudden increase in blood pressure actually dropped his blood pressure to the point where he was dizzy and fatigued.  Therefore he decreased his Norvasc from 10 mg to 2.5 mg less than a week ago.  His blood pressure has been running 120s over 80 generally since that time.  His sense of fatigue and dizziness have improved.        Review of Systems   Constitutional: Negative for activity change, appetite change and fatigue.   Respiratory: Negative for chest tightness and shortness of breath.    Musculoskeletal: Negative for gait problem.   Neurological: Negative for dizziness, weakness and headaches.       Objective   Physical Exam   Constitutional: He is oriented to person, place, and time. He appears well-developed and well-nourished. He is active. He does not appear ill.   Cardiovascular: Normal rate, regular rhythm, S1 normal and S2 normal.  Exam reveals no S3 and no S4.    No murmur heard.  Pulmonary/Chest: No tachypnea. No respiratory distress. He has no decreased breath sounds. He has no wheezes. He has no rhonchi. He has no rales.   Abdominal: Soft. Normal appearance and bowel sounds are normal. He exhibits no abdominal bruit and no mass. There is no hepatosplenomegaly. There is no tenderness.       Vascular Status -  His exam exhibits no right foot edema. His exam exhibits no left foot edema.  Neurological: He is alert and oriented to person, place, and time. Gait normal.   Psychiatric: He has a normal mood and affect. His speech is normal and behavior is normal. Judgment and thought content normal. Cognition and memory are normal.       Assessment/Plan resting pulse is 76.  Total cholesterol 174 triglycerides 59 HDL cholesterol 47 LDL cholesterol 115.    Assessment #1  hypertension-appropriate control on present regimen of Norvasc 2.5 mg, losartan 100 mg, metoprolol ER 50 mg daily.  #2 hypercholesterolemia-good control and without sign of target organ injury    Plan #1 no change medication.  Routine follow-up with me in 4 months.

## 2017-05-30 ENCOUNTER — TELEPHONE (OUTPATIENT)
Dept: INTERNAL MEDICINE | Facility: CLINIC | Age: 52
End: 2017-05-30

## 2017-07-10 ENCOUNTER — TELEPHONE (OUTPATIENT)
Dept: INTERNAL MEDICINE | Facility: CLINIC | Age: 52
End: 2017-07-10

## 2017-07-10 RX ORDER — AMLODIPINE BESYLATE 2.5 MG/1
2.5 TABLET ORAL DAILY
Qty: 90 TABLET | Refills: 3 | Status: SHIPPED | OUTPATIENT
Start: 2017-07-10 | End: 2017-07-13

## 2017-07-10 NOTE — TELEPHONE ENCOUNTER
----- Message from Aide Fall MA sent at 7/10/2017  9:00 AM EDT -----  Pt calling for refill reflecting new dose increase     Amlodipine 5mg    [phone]

## 2017-07-11 ENCOUNTER — PATIENT MESSAGE (OUTPATIENT)
Dept: INTERNAL MEDICINE | Facility: CLINIC | Age: 52
End: 2017-07-11

## 2017-07-11 RX ORDER — LORAZEPAM 2 MG/1
2-4 TABLET ORAL 3 TIMES DAILY PRN
Qty: 150 TABLET | Refills: 0 | Status: SHIPPED | OUTPATIENT
Start: 2017-07-11 | End: 2017-09-08 | Stop reason: SDUPTHER

## 2017-07-11 RX ORDER — LORAZEPAM 2 MG/1
2 TABLET ORAL 2 TIMES DAILY
Qty: 150 TABLET | Refills: 3 | Status: SHIPPED | OUTPATIENT
Start: 2017-07-11 | End: 2017-07-11 | Stop reason: SDUPTHER

## 2017-07-11 RX ORDER — LORAZEPAM 2 MG/1
2 TABLET ORAL 2 TIMES DAILY
Qty: 150 TABLET | Refills: 3 | OUTPATIENT
Start: 2017-07-11 | End: 2017-07-11 | Stop reason: SDUPTHER

## 2017-07-11 NOTE — TELEPHONE ENCOUNTER
From: Walter Fish  To: Gui Bloom Jr., MD  Sent: 7/11/2017 10:51 AM EDT  Subject: Non-Urgent Medical Question    Did you call in my 5 mg blood pressure med as well? I am out if 2.5 mg.

## 2017-07-11 NOTE — PROGRESS NOTES
Dr. Bloom made updates on Mr. Fish's prescription.  I went into the chart and updated there as well.  That is why it made changes to it.

## 2017-07-11 NOTE — TELEPHONE ENCOUNTER
----- Message from Damion Serrano sent at 7/10/2017  9:28 AM EDT -----  Contact: pt  Pt calling again and would like a refill on RX sent into Pharmacy. Please advise.          LORazepam (ATIVAN) 2 MG tablet    He just realized he has no more refills.     Send to :       [facility]/pharmacy #[id number] - [place], [place] - [address] AT [facility] - [phone] [facility] [phone] FX     Pt#

## 2017-07-13 ENCOUNTER — DOCUMENTATION (OUTPATIENT)
Dept: INTERNAL MEDICINE | Facility: CLINIC | Age: 52
End: 2017-07-13

## 2017-07-13 ENCOUNTER — TELEPHONE (OUTPATIENT)
Dept: INTERNAL MEDICINE | Facility: CLINIC | Age: 52
End: 2017-07-13

## 2017-07-13 RX ORDER — AMLODIPINE BESYLATE 10 MG/1
10 TABLET ORAL DAILY
Qty: 90 TABLET | Refills: 3 | Status: SHIPPED | OUTPATIENT
Start: 2017-07-13 | End: 2018-05-29 | Stop reason: SDUPTHER

## 2017-07-13 NOTE — TELEPHONE ENCOUNTER
Zehra called Pt and talked to him about his med. Rx called in to [facility] pharmacy ,its Norvasc 5 mg by mouth daily, #90 refill ×3.

## 2017-07-13 NOTE — TELEPHONE ENCOUNTER
----- Message from Damion Serrano sent at 7/13/2017 12:44 PM EDT -----  Contact: pt  Pt calling again. He says that Dr. Bloom had changed his amlodipine to 5 mg. He says that 10 mg is wrong. He is upset. He says that Dr. Bloom changed it to 5 mg. Please advise.     Amlodipine 5mg       [facility]/pharmacy #[id number] - [facility], [place] - [address] AT [facility] [phone] [facility] [phone]     Pt would like you to call him. When it is done.

## 2017-07-13 NOTE — TELEPHONE ENCOUNTER
I was looking through his chart.  On 03/20/2017  Dr. Bloom stated Mr. Fish should be taking 10 MG of Norvasc.      Then on 04/21/2017  Dr. Bloom stated patient should be taking 2.5MG of Norvasc.  So that was called in to the pharmacy.      Patient called and stated he should be on 5 MG.  However I do not see anything in the notes that states that.        Please advise on correct dose.

## 2017-07-13 NOTE — TELEPHONE ENCOUNTER
After looking at Mr. Fish's chart he should be taking 10 MG.  I resent the prescription for the correct dose.

## 2017-07-13 NOTE — TELEPHONE ENCOUNTER
----- Message from rBielle Garcia sent at 7/13/2017 10:44 AM EDT -----  Contact: pt - Dr Bloom's pt - RE: corrected Rx  Pt calling and would like a return call regarding pt's Rx. Pt would like a corrected Rx called to pt's pharmacy? Pt informs that Rx was called in incorrectly. Pt informs that this happens every time. Pt informs that Dr Bloom changed dosage. Could you please call pt to discuss? Please advise. Thanks    amLODIPine (NORVASC) 2.5 MG tablet 90 tablet 3 7/10/2017      Sig - Route: Take 1 tablet by mouth Daily. - Oral      [id number] [place] - [address] AT [facility] - [phone]  - [phone]  [phone] (Phone)  [phone] (Fax)    Pt # [phone]

## 2017-09-08 ENCOUNTER — OFFICE VISIT (OUTPATIENT)
Dept: INTERNAL MEDICINE | Facility: CLINIC | Age: 52
End: 2017-09-08

## 2017-09-08 VITALS
SYSTOLIC BLOOD PRESSURE: 124 MMHG | HEART RATE: 76 BPM | HEIGHT: 71 IN | DIASTOLIC BLOOD PRESSURE: 84 MMHG | BODY MASS INDEX: 30.94 KG/M2 | WEIGHT: 221 LBS

## 2017-09-08 DIAGNOSIS — E78.00 ELEVATED CHOLESTEROL: Primary | ICD-10-CM

## 2017-09-08 DIAGNOSIS — I10 ESSENTIAL HYPERTENSION: ICD-10-CM

## 2017-09-08 LAB
BUN SERPL-MCNC: 14 MG/DL (ref 6–20)
BUN/CREAT SERPL: 12.3 (ref 7–25)
CALCIUM SERPL-MCNC: 9.5 MG/DL (ref 8.6–10.5)
CHLORIDE SERPL-SCNC: 101 MMOL/L (ref 98–107)
CHOLEST SERPL-MCNC: 183 MG/DL (ref 0–200)
CO2 SERPL-SCNC: 26.2 MMOL/L (ref 22–29)
CREAT SERPL-MCNC: 1.14 MG/DL (ref 0.76–1.27)
GLUCOSE SERPL-MCNC: 85 MG/DL (ref 65–99)
HDLC SERPL-MCNC: 46 MG/DL (ref 40–60)
LDLC SERPL CALC-MCNC: 120 MG/DL (ref 0–100)
POTASSIUM SERPL-SCNC: 4.8 MMOL/L (ref 3.5–5.2)
SODIUM SERPL-SCNC: 141 MMOL/L (ref 136–145)
TRIGL SERPL-MCNC: 85 MG/DL (ref 0–150)
VLDLC SERPL-MCNC: 17 MG/DL (ref 5–40)

## 2017-09-08 PROCEDURE — 99213 OFFICE O/P EST LOW 20 MIN: CPT | Performed by: INTERNAL MEDICINE

## 2017-09-08 RX ORDER — LORAZEPAM 2 MG/1
2 TABLET ORAL NIGHTLY PRN
Qty: 90 TABLET | Refills: 1 | OUTPATIENT
Start: 2017-09-08 | End: 2018-02-18 | Stop reason: ALTCHOICE

## 2017-09-08 NOTE — PROGRESS NOTES
Subjective   Walter Fish is a 52 y.o. male.     History of Present Illness for follow-up of hypertension and hypercholesterolemia.  Presently he is on Norvasc 10 mg daily and losartan 100 mg daily with good control of his blood pressure.  He uses Ativan at night for sleep.  He denies any dizziness or focal neurologic symptoms.  He has not had any dyspnea on exertion, chest pain, or PND.        Review of Systems   Constitutional: Negative for activity change, appetite change and fatigue.   Respiratory: Negative for cough, chest tightness, shortness of breath and wheezing.    Cardiovascular: Negative for chest pain, palpitations and leg swelling.   Gastrointestinal: Negative for abdominal pain, diarrhea, nausea and vomiting.   Genitourinary: Negative for dysuria, flank pain, frequency and hematuria.   Neurological: Negative for dizziness, syncope, facial asymmetry, speech difficulty, weakness and headaches.   Psychiatric/Behavioral: Negative for confusion and dysphoric mood. The patient is nervous/anxious.        Objective   Physical Exam   Constitutional: He is oriented to person, place, and time. Vital signs are normal. He appears well-developed and well-nourished. He is active. He does not appear ill.   Eyes: Conjunctivae are normal.   Neck: Carotid bruit is not present.   Cardiovascular: Normal rate, regular rhythm and S1 normal.  Exam reveals no S3 and no S4.    No murmur heard.  Pulses:       Posterior tibial pulses are 2+ on the right side, and 2+ on the left side.   Pulmonary/Chest: No tachypnea. No respiratory distress. He has no decreased breath sounds. He has no wheezes. He has no rhonchi. He has no rales.   Abdominal: Soft. Normal appearance and bowel sounds are normal. He exhibits no abdominal bruit and no mass. There is no hepatosplenomegaly. There is no tenderness.       Vascular Status -  His exam exhibits no right foot edema. His exam exhibits no left foot edema.  Neurological: He is alert and  oriented to person, place, and time. Gait normal.   Psychiatric: He has a normal mood and affect. His speech is normal and behavior is normal. Judgment and thought content normal. Cognition and memory are normal.       Assessment/Plan assessment #1 hypertension-good control on present regimen #2 hypercholesterolemia-thus far controlled with diet and no sign of target organ injury    Plan #1 no change medication #2 lipids and BMP today.  Routine follow-up with me in 6 months.

## 2017-09-12 ENCOUNTER — TELEPHONE (OUTPATIENT)
Dept: INTERNAL MEDICINE | Facility: CLINIC | Age: 52
End: 2017-09-12

## 2017-09-12 NOTE — TELEPHONE ENCOUNTER
----- Message from Walter Fish sent at 9/11/2017 10:10 PM EDT -----  Regarding: Prescription Question  Contact: [phone]  I was taking Ativan 2m twice daily but you prescribed 2m once a day.  It is not safe to reduce this medicine like that.  Can cause seizures and other issues.  Why does our prescription keep getting messed up.  The paper I took home said 2m 2 to 3 times per day.  We need to get the meds under control or I am going to need to find a new doctor.  This has been going in for over a year now.      Cole

## 2017-10-18 ENCOUNTER — TELEPHONE (OUTPATIENT)
Dept: INTERNAL MEDICINE | Facility: CLINIC | Age: 52
End: 2017-10-18

## 2017-10-18 NOTE — TELEPHONE ENCOUNTER
----- Message from Walter Fish sent at 10/17/2017  8:04 PM EDT -----  Regarding: RE: RE: Prescription Question  Contact: [phone]  I am running out of Ativan pills.  He cut my script back to 1  2mg per day but I was taking 2 mg 2 to 3 times per day.  [facility].      Augustina Galvan      ----- Message -----  From: JENS JOYCE  Sent: 9/12/17 12:14 PM  To: Walter Fish  Subject: RE: Prescription Question    Per Dr. Bloom,    The prescription filled is always based on the consumption since the prior refill.          ----- Message -----     From: Walter Fish     Sent: 9/11/2017 10:10 PM EDT       To: Gui Bloom Jr., MD  Subject: Prescription Question    I was taking Ativan 2m twice daily but you prescribed 2m once a day.  It is not safe to reduce this medicine like that.  Can cause seizures and other issues.  Why does our prescription keep getting messed up.  The paper I took home said 2m 2 to 3 times per day.  We need to get the meds under control or I am going to need to find a new doctor.  This has been going in for over a year now.      Cole

## 2017-12-17 ENCOUNTER — HOSPITAL ENCOUNTER (EMERGENCY)
Facility: HOSPITAL | Age: 52
Discharge: HOME | End: 2017-12-18
Attending: EMERGENCY MEDICINE | Admitting: EMERGENCY MEDICINE

## 2017-12-17 DIAGNOSIS — R11.2 NAUSEA VOMITING AND DIARRHEA: Primary | ICD-10-CM

## 2017-12-17 DIAGNOSIS — R19.7 NAUSEA VOMITING AND DIARRHEA: Primary | ICD-10-CM

## 2017-12-17 DIAGNOSIS — A09 GASTROENTERITIS, INFECTIOUS: ICD-10-CM

## 2017-12-17 LAB
ALBUMIN SERPL-MCNC: 4.2 G/DL (ref 3.5–5.2)
ALBUMIN/GLOB SERPL: 1.2 G/DL
ALP SERPL-CCNC: 97 U/L (ref 39–117)
ALT SERPL W P-5'-P-CCNC: 34 U/L (ref 1–41)
ANION GAP SERPL CALCULATED.3IONS-SCNC: 17.3 MMOL/L
AST SERPL-CCNC: 20 U/L (ref 1–40)
BASOPHILS # BLD AUTO: 0.02 10*3/MM3 (ref 0–0.2)
BASOPHILS NFR BLD AUTO: 0.1 % (ref 0–1.5)
BILIRUB SERPL-MCNC: 0.7 MG/DL (ref 0.1–1.2)
BILIRUB UR QL STRIP: NEGATIVE
BUN BLD-MCNC: 21 MG/DL (ref 6–20)
BUN/CREAT SERPL: 15.9 (ref 7–25)
CALCIUM SPEC-SCNC: 8.9 MG/DL (ref 8.6–10.5)
CHLORIDE SERPL-SCNC: 103 MMOL/L (ref 98–107)
CLARITY UR: CLEAR
CO2 SERPL-SCNC: 20.7 MMOL/L (ref 22–29)
COLOR UR: ABNORMAL
CREAT BLD-MCNC: 1.32 MG/DL (ref 0.76–1.27)
D-LACTATE SERPL-SCNC: 1.7 MMOL/L (ref 0.5–2)
DEPRECATED RDW RBC AUTO: 43.4 FL (ref 37–54)
EOSINOPHIL # BLD AUTO: 0 10*3/MM3 (ref 0–0.7)
EOSINOPHIL NFR BLD AUTO: 0 % (ref 0.3–6.2)
ERYTHROCYTE [DISTWIDTH] IN BLOOD BY AUTOMATED COUNT: 13.2 % (ref 11.5–14.5)
GFR SERPL CREATININE-BSD FRML MDRD: 57 ML/MIN/1.73
GLOBULIN UR ELPH-MCNC: 3.4 GM/DL
GLUCOSE BLD-MCNC: 126 MG/DL (ref 65–99)
GLUCOSE UR STRIP-MCNC: NEGATIVE MG/DL
HCT VFR BLD AUTO: 49.8 % (ref 40.4–52.2)
HGB BLD-MCNC: 16.6 G/DL (ref 13.7–17.6)
HGB UR QL STRIP.AUTO: NEGATIVE
IMM GRANULOCYTES # BLD: 0.08 10*3/MM3 (ref 0–0.03)
IMM GRANULOCYTES NFR BLD: 0.5 % (ref 0–0.5)
KETONES UR QL STRIP: NEGATIVE
LEUKOCYTE ESTERASE UR QL STRIP.AUTO: NEGATIVE
LIPASE SERPL-CCNC: 13 U/L (ref 13–60)
LYMPHOCYTES # BLD AUTO: 0.74 10*3/MM3 (ref 0.9–4.8)
LYMPHOCYTES NFR BLD AUTO: 4.8 % (ref 19.6–45.3)
MCH RBC QN AUTO: 30.1 PG (ref 27–32.7)
MCHC RBC AUTO-ENTMCNC: 33.3 G/DL (ref 32.6–36.4)
MCV RBC AUTO: 90.4 FL (ref 79.8–96.2)
MONOCYTES # BLD AUTO: 1.25 10*3/MM3 (ref 0.2–1.2)
MONOCYTES NFR BLD AUTO: 8.1 % (ref 5–12)
NEUTROPHILS # BLD AUTO: 13.4 10*3/MM3 (ref 1.9–8.1)
NEUTROPHILS NFR BLD AUTO: 86.5 % (ref 42.7–76)
NITRITE UR QL STRIP: NEGATIVE
PH UR STRIP.AUTO: 5.5 [PH] (ref 5–8)
PLATELET # BLD AUTO: 313 10*3/MM3 (ref 140–500)
PMV BLD AUTO: 9.6 FL (ref 6–12)
POTASSIUM BLD-SCNC: 3.8 MMOL/L (ref 3.5–5.2)
PROT SERPL-MCNC: 7.6 G/DL (ref 6–8.5)
PROT UR QL STRIP: ABNORMAL
RBC # BLD AUTO: 5.51 10*6/MM3 (ref 4.6–6)
SODIUM BLD-SCNC: 141 MMOL/L (ref 136–145)
SP GR UR STRIP: 1.03 (ref 1–1.03)
UROBILINOGEN UR QL STRIP: ABNORMAL
WBC NRBC COR # BLD: 15.49 10*3/MM3 (ref 4.5–10.7)

## 2017-12-17 PROCEDURE — 96361 HYDRATE IV INFUSION ADD-ON: CPT

## 2017-12-17 PROCEDURE — 83605 ASSAY OF LACTIC ACID: CPT | Performed by: NURSE PRACTITIONER

## 2017-12-17 PROCEDURE — 83690 ASSAY OF LIPASE: CPT | Performed by: NURSE PRACTITIONER

## 2017-12-17 PROCEDURE — 96374 THER/PROPH/DIAG INJ IV PUSH: CPT

## 2017-12-17 PROCEDURE — 87040 BLOOD CULTURE FOR BACTERIA: CPT | Performed by: NURSE PRACTITIONER

## 2017-12-17 PROCEDURE — 36415 COLL VENOUS BLD VENIPUNCTURE: CPT

## 2017-12-17 PROCEDURE — 99284 EMERGENCY DEPT VISIT MOD MDM: CPT

## 2017-12-17 PROCEDURE — 25010000002 ONDANSETRON PER 1 MG: Performed by: EMERGENCY MEDICINE

## 2017-12-17 PROCEDURE — 85025 COMPLETE CBC W/AUTO DIFF WBC: CPT | Performed by: NURSE PRACTITIONER

## 2017-12-17 PROCEDURE — 80053 COMPREHEN METABOLIC PANEL: CPT | Performed by: NURSE PRACTITIONER

## 2017-12-17 PROCEDURE — 81003 URINALYSIS AUTO W/O SCOPE: CPT | Performed by: NURSE PRACTITIONER

## 2017-12-17 RX ORDER — ONDANSETRON 2 MG/ML
4 INJECTION INTRAMUSCULAR; INTRAVENOUS ONCE
Status: COMPLETED | OUTPATIENT
Start: 2017-12-17 | End: 2017-12-18

## 2017-12-17 RX ORDER — ACETAMINOPHEN 500 MG
1000 TABLET ORAL ONCE
Status: COMPLETED | OUTPATIENT
Start: 2017-12-17 | End: 2017-12-17

## 2017-12-17 RX ORDER — ONDANSETRON 2 MG/ML
4 INJECTION INTRAMUSCULAR; INTRAVENOUS ONCE
Status: COMPLETED | OUTPATIENT
Start: 2017-12-17 | End: 2017-12-17

## 2017-12-17 RX ORDER — HYDROMORPHONE HYDROCHLORIDE 1 MG/ML
0.5 INJECTION, SOLUTION INTRAMUSCULAR; INTRAVENOUS; SUBCUTANEOUS ONCE
Status: COMPLETED | OUTPATIENT
Start: 2017-12-17 | End: 2017-12-18

## 2017-12-17 RX ADMIN — SODIUM CHLORIDE 1000 ML: 9 INJECTION, SOLUTION INTRAVENOUS at 23:07

## 2017-12-17 RX ADMIN — ONDANSETRON 4 MG: 2 INJECTION INTRAMUSCULAR; INTRAVENOUS at 23:07

## 2017-12-17 RX ADMIN — ACETAMINOPHEN 1000 MG: 500 TABLET ORAL at 23:03

## 2017-12-18 ENCOUNTER — APPOINTMENT (OUTPATIENT)
Dept: CT IMAGING | Facility: HOSPITAL | Age: 52
End: 2017-12-18

## 2017-12-18 VITALS
HEIGHT: 71 IN | OXYGEN SATURATION: 95 % | RESPIRATION RATE: 16 BRPM | WEIGHT: 219 LBS | BODY MASS INDEX: 30.66 KG/M2 | DIASTOLIC BLOOD PRESSURE: 84 MMHG | TEMPERATURE: 97 F | SYSTOLIC BLOOD PRESSURE: 113 MMHG | HEART RATE: 99 BPM

## 2017-12-18 PROCEDURE — 96376 TX/PRO/DX INJ SAME DRUG ADON: CPT

## 2017-12-18 PROCEDURE — 25010000002 HYDROMORPHONE PER 4 MG: Performed by: EMERGENCY MEDICINE

## 2017-12-18 PROCEDURE — 96375 TX/PRO/DX INJ NEW DRUG ADDON: CPT

## 2017-12-18 PROCEDURE — 74177 CT ABD & PELVIS W/CONTRAST: CPT

## 2017-12-18 PROCEDURE — 0 IOPAMIDOL 61 % SOLUTION: Performed by: EMERGENCY MEDICINE

## 2017-12-18 PROCEDURE — 25010000002 ONDANSETRON PER 1 MG: Performed by: EMERGENCY MEDICINE

## 2017-12-18 RX ORDER — ONDANSETRON 8 MG/1
8 TABLET, ORALLY DISINTEGRATING ORAL EVERY 8 HOURS PRN
Qty: 15 TABLET | Refills: 0 | Status: SHIPPED | OUTPATIENT
Start: 2017-12-18 | End: 2018-02-18

## 2017-12-18 RX ADMIN — IOPAMIDOL 85 ML: 612 INJECTION, SOLUTION INTRAVENOUS at 00:08

## 2017-12-18 RX ADMIN — HYDROMORPHONE HYDROCHLORIDE 0.5 MG: 1 INJECTION, SOLUTION INTRAMUSCULAR; INTRAVENOUS; SUBCUTANEOUS at 00:17

## 2017-12-18 RX ADMIN — ONDANSETRON 4 MG: 2 INJECTION INTRAMUSCULAR; INTRAVENOUS at 00:17

## 2017-12-18 NOTE — ED NOTES
Asked pt to provide a urine specimen and he said he wasn't sure if he could right this moment, I set a urinal on the bed rail and informed the wife as soon as he urinates to come grab me so that I could send up the sample.       George Pimentel  12/17/17 9653

## 2017-12-18 NOTE — ED PROVIDER NOTES
EMERGENCY DEPARTMENT ENCOUNTER    CHIEF COMPLAINT  Chief Complaint: Nausea, vomiting, diarrhea  History given by: patient   History limited by: n/a  Room Number: 15/15  PMD: Gui Bloom Jr., MD      HPI:  Pt is a 52 y.o. male who presents complaining of nausea vomiting and diarrhea that began at 0200. Pt states that over the course of the day, he developed a fever and generalized abd pain. Temp on arrival to the ED is 100.6.  He denies known sick contact or recent abx use.     Duration:  21 hours  Onset: gradual  Timing: constant   Location: GI  Radiation: n/a  Intensity/Severity: moderate  Progression: unchanged   Associated Symptoms: abd pain, fever   Aggravating Factors: none  Alleviating Factors: none  Treatment before arrival: none    PAST MEDICAL HISTORY  Active Ambulatory Problems     Diagnosis Date Noted   • Anxiety 02/26/2016   • Insomnia, persistent 02/26/2016   • Acid reflux 02/26/2016   • Elevated cholesterol 02/26/2016   • BP (high blood pressure) 02/26/2016   • Status post cholecystectomy 02/24/2017     Resolved Ambulatory Problems     Diagnosis Date Noted   • Abdominal pain, acute, right upper quadrant 01/31/2017     Past Medical History:   Diagnosis Date   • Hypertension        PAST SURGICAL HISTORY  Past Surgical History:   Procedure Laterality Date   • ANAL FISSURECTOMY     • ANKLE SURGERY Right    • CHOLECYSTECTOMY N/A 2/3/2017    Procedure: CHOLECYSTECTOMY LAPAROSCOPIC;  Surgeon: Dakota Mckeon MD;  Location[facility] OR [facility];  Service:    • HAND SURGERY Right    • NASAL SINUS SURGERY         FAMILY HISTORY  History reviewed. No pertinent family history.    SOCIAL HISTORY  Social History     Social History   • Marital status:      Spouse name: N/A   • Number of children: N/A   • Years of education: N/A     Occupational History   • Not on file.     Social History Main Topics   • Smoking status: Never Smoker   • Smokeless tobacco: Never Used   • Alcohol use No   • Drug use: No   • Sexual  activity: Defer     Other Topics Concern   • Not on file     Social History Narrative       ALLERGIES  Review of patient's allergies indicates no known allergies.    REVIEW OF SYSTEMS  Review of Systems   Constitutional: Positive for fever. Negative for chills.   HENT: Negative for congestion and sore throat.    Eyes: Negative.    Respiratory: Negative for cough and shortness of breath.    Cardiovascular: Negative for chest pain and leg swelling.   Gastrointestinal: Positive for abdominal pain, diarrhea, nausea and vomiting.   Genitourinary: Negative for difficulty urinating and dysuria.   Musculoskeletal: Negative for back pain and neck pain.   Skin: Negative for rash and wound.   Allergic/Immunologic: Negative.    Neurological: Negative for dizziness, weakness, numbness and headaches.   Psychiatric/Behavioral: Negative.    All other systems reviewed and are negative.      PHYSICAL EXAM  ED Triage Vitals   Temp Heart Rate Resp BP SpO2   12/17/17 2059 12/17/17 2059 12/17/17 2059 12/17/17 2059 12/17/17 2059   100.6 °F (38.1 °C) 130 16 112/72 97 %      Temp src Heart Rate Source Patient Position BP Location FiO2 (%)   12/17/17 2059 12/17/17 2059 -- -- --   Tympanic Monitor          Physical Exam   Constitutional: He is oriented to person, place, and time and well-developed, well-nourished, and in no distress.   HENT:   Head: Normocephalic and atraumatic.   Mouth/Throat: Mucous membranes are dry.   Eyes: EOM are normal. Pupils are equal, round, and reactive to light.   Neck: Normal range of motion. Neck supple.   Cardiovascular: Regular rhythm and normal heart sounds.  Tachycardia present.    Pulmonary/Chest: Effort normal and breath sounds normal. No respiratory distress.   Abdominal: Soft. There is generalized tenderness. There is no rebound and no guarding.   Musculoskeletal: Normal range of motion. He exhibits no edema.   Neurological: He is alert and oriented to person, place, and time. He has normal sensation and  normal strength.   Skin: Skin is warm and dry.   Psychiatric: Mood and affect normal.   Nursing note and vitals reviewed.      LAB RESULTS  Lab Results (last 24 hours)     ** No results found for the last 24 hours. **          I ordered the above labs and reviewed the results    RADIOLOGY  CT Abdomen Pelvis With Contrast   Preliminary Result   1.  Enteritis is suspected predominantly involving the ileum. No   obstruction, perforation or abscess.    2.  Fatty liver and postcholecystectomy changes.                   I ordered the above noted radiological studies. Interpreted by radiologist. Reviewed by me in PACS.       PROCEDURES  Procedures      PROGRESS AND CONSULTS  ED Course     2303  Tylenol ordered to treat fever.     2305  IVF ordered for hydration. Zofran ordered to treat nausea.     2312  BP- 118/80 HR- 120 Temp- (!) 100.6 °F (38.1 °C) (Tympanic) O2 sat- 94%  Advised pt of the plan for IVF. Awaiting lab results. Pt understands and agrees with the plan, all questions answered.    2318  CT abd/pelvis ordered    2356  Dilaudid and Zofran ordered to treat pain and nausea.     0126  BP- 120/84 HR- 109 Temp- 98.1 °F (36.7 °C) (Tympanic) O2 sat- 92%  Rechecked the patient who is in NAD and is resting comfortably. Advised pt of the CT findings of enteritis. Informed him of the plan for discharge with rx for Zofran. Pt understands and agrees with the plan, all questions answered.    MEDICAL DECISION MAKING  Results were reviewed/discussed with the patient and they were also made aware of online access. Pt also made aware that some labs, such as cultures, will not be resulted during ER visit and follow up with PMD is necessary.     MDM  Number of Diagnoses or Management Options     Amount and/or Complexity of Data Reviewed  Clinical lab tests: ordered and reviewed (WBC=15.49)  Tests in the radiology section of CPT®: ordered and reviewed (CT abd/pelvis shows enteritis is suspected predominantly involving the ileum. No  obstruction, perforation or abscess. Fatty liver and postcholecystectomy changes)           DIAGNOSIS  Final diagnoses:   Nausea vomiting and diarrhea   Gastroenteritis, infectious       DISPOSITION  DISCHARGE    Patient discharged in stable condition.    Reviewed implications of results, diagnosis, meds, responsibility to follow up, warning signs and symptoms of possible worsening, potential complications and reasons to return to ER.    Patient/Family voiced understanding of above instructions.    Discussed plan for discharge, as there is no emergent indication for admission.  Pt/family is agreeable and understands need for follow up and repeat testing.  Pt is aware that discharge does not mean that nothing is wrong but it indicates no emergency is present that requires admission and they must continue care with follow-up as given below or physician of their choice.     FOLLOW-UP  Gui Bloom Jr., MD  [facility] [place] [address]  [phone]    Schedule an appointment as soon as possible for a visit           Medication List      New Prescriptions          ondansetron ODT 8 MG disintegrating tablet   Commonly known as:  ZOFRAN ODT   Take 1 tablet by mouth Every 8 (Eight) Hours As Needed for Nausea or   Vomiting.         Stop          losartan 100 MG tablet   Commonly known as:  COZAAR           Latest Documented Vital Signs:  As of 10:53 PM  BP- 113/84 HR- 99 Temp- 97 °F (36.1 °C) (Tympanic) O2 sat- 95%    --  Documentation assistance provided by claudette Ware for Dr Davila.  Information recorded by the scribe was done at my direction and has been verified and validated by me.        Daisha Ware  12/18/17 3039       Prasad Davila MD  12/18/17 6689

## 2017-12-19 ENCOUNTER — TELEPHONE (OUTPATIENT)
Dept: SOCIAL WORK | Facility: HOSPITAL | Age: 52
End: 2017-12-19

## 2017-12-20 ENCOUNTER — TELEPHONE (OUTPATIENT)
Dept: INTERNAL MEDICINE | Facility: CLINIC | Age: 52
End: 2017-12-20

## 2017-12-20 RX ORDER — AZITHROMYCIN 250 MG/1
TABLET, FILM COATED ORAL
Qty: 6 TABLET | Refills: 0 | Status: SHIPPED | OUTPATIENT
Start: 2017-12-20 | End: 2018-02-18

## 2017-12-20 NOTE — TELEPHONE ENCOUNTER
----- Message from Brielle Garcia sent at 12/20/2017 12:28 PM EST -----  Contact: pt - Dr Bloom's pt - RE: new Rx  Pt calling and would like a return call regarding pt in ER for Flu. Pt would like an antibiotic for pt cough. Pt informs is coughing up yellowish phlegm when coughing. Pt was seen @ [facility] ER. Pt informs was not given anything for cough or Flu, just fluids. Could you please call pt if any questions or concerns? Please advise. Thanks      [id number] [place] [place] [facility] [address] AT [facility] - [phone] [facility] [phone] FX    Pt # [phone]

## 2017-12-22 LAB
BACTERIA SPEC AEROBE CULT: NORMAL
BACTERIA SPEC AEROBE CULT: NORMAL

## 2018-02-15 RX ORDER — AMOXICILLIN AND CLAVULANATE POTASSIUM 875; 125 MG/1; MG/1
1 TABLET, FILM COATED ORAL 2 TIMES DAILY
Qty: 20 TABLET | Refills: 0 | Status: SHIPPED | OUTPATIENT
Start: 2018-02-15 | End: 2018-03-09

## 2018-02-15 RX ORDER — LANSOPRAZOLE 30 MG/1
CAPSULE, DELAYED RELEASE ORAL
Qty: 90 CAPSULE | Refills: 3 | Status: SHIPPED | OUTPATIENT
Start: 2018-02-15 | End: 2019-02-18 | Stop reason: SDUPTHER

## 2018-02-15 RX ORDER — LOSARTAN POTASSIUM 100 MG/1
TABLET ORAL
Qty: 90 TABLET | Refills: 3 | Status: SHIPPED | OUTPATIENT
Start: 2018-02-15 | End: 2019-07-15

## 2018-02-15 NOTE — TELEPHONE ENCOUNTER
----- Message from Amara Kaleighramsey sent at 2/15/2018  8:19 AM EST -----  Contact: pt  Pt is not feeling well    Symptom list:  Fever/Achey? achey  sore throat? yes  Coughing? yes  Productive/color? Yes green  Chest congestion? yes  Sinus pressure? yes  Nausea/diarrhea?  no  How long has this been going on?  A week  Have you tried taking anything?  No    Caller#[phone]    Please advise, recommended acute or urgent care.     [id number] [place], [place] - [address] AT [facility] - [phone] [facility] [phone] FX

## 2018-02-18 ENCOUNTER — OFFICE VISIT (OUTPATIENT)
Dept: RETAIL CLINIC | Facility: CLINIC | Age: 53
End: 2018-02-18

## 2018-02-18 VITALS
DIASTOLIC BLOOD PRESSURE: 88 MMHG | OXYGEN SATURATION: 98 % | SYSTOLIC BLOOD PRESSURE: 135 MMHG | HEART RATE: 106 BPM | RESPIRATION RATE: 20 BRPM | TEMPERATURE: 100 F

## 2018-02-18 DIAGNOSIS — J10.1 INFLUENZA B: Primary | ICD-10-CM

## 2018-02-18 DIAGNOSIS — J01.40 ACUTE NON-RECURRENT PANSINUSITIS: ICD-10-CM

## 2018-02-18 LAB
EXPIRATION DATE: NORMAL
FLUAV AG NPH QL: NEGATIVE
FLUBV AG NPH QL: POSITIVE
INTERNAL CONTROL: NORMAL
Lab: NORMAL

## 2018-02-18 PROCEDURE — 99213 OFFICE O/P EST LOW 20 MIN: CPT | Performed by: NURSE PRACTITIONER

## 2018-02-18 PROCEDURE — 87804 INFLUENZA ASSAY W/OPTIC: CPT | Performed by: NURSE PRACTITIONER

## 2018-02-18 RX ORDER — PREDNISONE 20 MG/1
20 TABLET ORAL 2 TIMES DAILY
Qty: 10 TABLET | Refills: 0 | Status: SHIPPED | OUTPATIENT
Start: 2018-02-18 | End: 2018-02-23

## 2018-02-18 NOTE — PATIENT INSTRUCTIONS

## 2018-02-18 NOTE — PROGRESS NOTES
Subjective   Walter Fish is a 53 y.o. male.     HPI Comments: Wife was recently diagnosed with influenza just prior to onset of his symptoms.    Sinusitis   This is a new problem. The current episode started in the past 7 days. The problem is unchanged. The maximum temperature recorded prior to his arrival was 101 - 101.9 F. The fever has been present for 1 to 2 days. The pain is mild. Associated symptoms include chills, congestion, coughing (nonproductive), diaphoresis, headaches and sinus pressure. Pertinent negatives include no ear pain, hoarse voice, neck pain, shortness of breath, sneezing, sore throat or swollen glands. Past treatments include antibiotics (has been taking augmentin x3 days for sinusitis PCP prescribed for him). The treatment provided mild relief.       The following portions of the patient's history were reviewed and updated as appropriate: allergies, current medications, past family history, past medical history, past social history, past surgical history and problem list.    Review of Systems   Constitutional: Positive for appetite change (diminished), chills, diaphoresis, fatigue and fever.   HENT: Positive for congestion, postnasal drip, rhinorrhea and sinus pressure. Negative for ear discharge, ear pain, facial swelling, hearing loss, hoarse voice, mouth sores, nosebleeds, sneezing, sore throat, trouble swallowing and voice change.    Eyes: Negative for pain, discharge, redness and itching.   Respiratory: Positive for cough (nonproductive) and chest tightness. Negative for shortness of breath, wheezing and stridor.    Cardiovascular: Negative for chest pain and palpitations.   Gastrointestinal: Negative for abdominal pain, constipation, diarrhea, nausea and vomiting.   Genitourinary: Negative for decreased urine volume.   Musculoskeletal: Positive for myalgias. Negative for neck pain and neck stiffness.   Skin: Negative for color change.   Allergic/Immunologic: Negative for  environmental allergies and immunocompromised state.   Neurological: Positive for headaches. Negative for dizziness, syncope and weakness.       Objective   Physical Exam   Constitutional: He is oriented to person, place, and time. He appears well-developed and well-nourished. He is cooperative.  Non-toxic appearance. He does not appear ill. No distress.   HENT:   Right Ear: Hearing, external ear and ear canal normal. Tympanic membrane is not perforated, not erythematous, not retracted and not bulging. A middle ear effusion is present.   Left Ear: Hearing, external ear and ear canal normal. Tympanic membrane is not perforated, not erythematous, not retracted and not bulging. A middle ear effusion is present.   Nose: Mucosal edema present. No rhinorrhea. Right sinus exhibits maxillary sinus tenderness and frontal sinus tenderness. Left sinus exhibits maxillary sinus tenderness and frontal sinus tenderness.   Mouth/Throat: Uvula is midline, oropharynx is clear and moist and mucous membranes are normal. Mucous membranes are not pale, not dry and not cyanotic. No oropharyngeal exudate, posterior oropharyngeal edema or posterior oropharyngeal erythema. Tonsils are 2+ on the right. Tonsils are 2+ on the left. No tonsillar exudate.   Eyes: Conjunctivae and lids are normal.   Cardiovascular: Normal rate, regular rhythm, S1 normal and S2 normal.    Pulmonary/Chest: Effort normal and breath sounds normal.   Abdominal: Bowel sounds are normal. There is no tenderness.   Lymphadenopathy:     He has no cervical adenopathy.   Neurological: He is alert and oriented to person, place, and time.   Skin: Skin is warm and dry. He is not diaphoretic.   Vitals reviewed.      Assessment/Plan   Walter was seen today for flu symptoms.    Diagnoses and all orders for this visit:    Influenza B  -     POC Influenza A / B    Acute non-recurrent pansinusitis  -     predniSONE (DELTASONE) 20 MG tablet; Take 1 tablet by mouth 2 (Two) Times a Day  for 5 days.          -     Rest, increase H2O intake, tylenol OTC PRN for pain/fever relief        -     Follow up with PCP or urgent treatment for persistent or worsening symptoms      Lab Results (last 7 days)     Procedure Component Value Units Date/Time    POC Influenza A / B [[id number]] Collected:  02/18/18 1542    Specimen:  Swab Updated:  02/18/18 1543     Rapid Influenza A Ag negative     Rapid Influenza B Ag POSITIVE     Internal Control Passed     Lot Number [id number]     Expiration Date 11/8/20

## 2018-03-09 ENCOUNTER — OFFICE VISIT (OUTPATIENT)
Dept: INTERNAL MEDICINE | Facility: CLINIC | Age: 53
End: 2018-03-09

## 2018-03-09 VITALS
HEART RATE: 69 BPM | BODY MASS INDEX: 31.08 KG/M2 | HEIGHT: 71 IN | SYSTOLIC BLOOD PRESSURE: 122 MMHG | OXYGEN SATURATION: 100 % | DIASTOLIC BLOOD PRESSURE: 98 MMHG | WEIGHT: 222 LBS

## 2018-03-09 DIAGNOSIS — I10 ESSENTIAL HYPERTENSION: Primary | ICD-10-CM

## 2018-03-09 DIAGNOSIS — K21.9 GASTROESOPHAGEAL REFLUX DISEASE WITHOUT ESOPHAGITIS: ICD-10-CM

## 2018-03-09 DIAGNOSIS — E78.00 ELEVATED CHOLESTEROL: ICD-10-CM

## 2018-03-09 PROCEDURE — 99214 OFFICE O/P EST MOD 30 MIN: CPT | Performed by: INTERNAL MEDICINE

## 2018-03-09 RX ORDER — LORAZEPAM 2 MG/1
TABLET ORAL
Refills: 5 | COMMUNITY
Start: 2018-02-25 | End: 2018-04-30 | Stop reason: SDUPTHER

## 2018-03-09 NOTE — PROGRESS NOTES
Subjective   Walter Fish is a 53 y.o. male.     History of Present Illness he is here today for his yearly follow-up which includes hypertension, GERD, and hypercholesterolemia.  He has done quite well.  At work he walks 20,000 steps per day.  His home blood pressure readings generally are 120s over 70s.  He has recovered from cystectomy in late 2017.  He denies any dizziness, syncope, or focal neurologic episodes.  He denies any dyspnea on exertion, PND, or chest pain.  He occasionally has and Pepcid takes care of that situation.  He denies any dysphagia, abdominal pain, melanotic stool, rectal bleeding, or change in bowel habit.  His review systems is negative.        Review of Systems   Constitutional: Negative for activity change, appetite change and fatigue.   HENT: Negative for trouble swallowing.    Respiratory: Negative for cough, chest tightness, shortness of breath and wheezing.    Cardiovascular: Negative for chest pain, palpitations and leg swelling.   Gastrointestinal: Negative for abdominal pain, anal bleeding, blood in stool, constipation, diarrhea, nausea and vomiting.   Genitourinary: Negative for difficulty urinating, dysuria, flank pain, frequency and hematuria.   Neurological: Negative for dizziness, syncope, facial asymmetry, speech difficulty, weakness, numbness and headaches.   Psychiatric/Behavioral: Negative for dysphoric mood. The patient is not nervous/anxious.        Objective   Physical Exam   Constitutional: He is oriented to person, place, and time. He appears well-developed and well-nourished. He is active. He does not appear ill.   Eyes: Conjunctivae are normal.   Fundoscopic exam:       The right eye shows no AV nicking, no exudate and no hemorrhage.        The left eye shows no AV nicking, no exudate and no hemorrhage.   Neck: Carotid bruit is not present. No thyroid mass and no thyromegaly present.   Cardiovascular: Normal rate, regular rhythm, S1 normal and S2 normal.  Exam  reveals no S3 and no S4.    No murmur heard.  Pulses:       Posterior tibial pulses are 2+ on the right side, and 2+ on the left side.   Pulmonary/Chest: No tachypnea. No respiratory distress. He has no decreased breath sounds. He has no wheezes. He has no rhonchi. He has no rales.   Abdominal: Soft. Normal appearance and bowel sounds are normal. He exhibits no abdominal bruit and no mass. There is no hepatosplenomegaly. There is no tenderness.       Vascular Status -  His exam exhibits no right foot edema. His exam exhibits no left foot edema.  Neurological: He is alert and oriented to person, place, and time. Gait normal.   Reflex Scores:       Bicep reflexes are 2+ on the right side.  Psychiatric: He has a normal mood and affect. His speech is normal and behavior is normal. Judgment and thought content normal. Cognition and memory are normal.       Assessment/Plan recent lab work showed normal BMP.  Total cholesterol 183 triglycerides 85 HDL cholesterol 46 LDL cholesterol 120    Assessment #1 hypertension-good out of office readings #2 hypercholesterolemia-controlled with diet and no sign of target organ injury #3 GERD-not a significant problem at present    Plan #1 no change medication.  Routine follow-up with me in 6 months.

## 2018-05-01 RX ORDER — LORAZEPAM 2 MG/1
TABLET ORAL
Qty: 90 TABLET | Refills: 5 | Status: SHIPPED | OUTPATIENT
Start: 2018-05-01 | End: 2019-02-18 | Stop reason: SDUPTHER

## 2018-05-01 NOTE — TELEPHONE ENCOUNTER
Please advise RX refill  Last ov 03/09/2018  Next ov not scheduled  Last filled 03/28/2018  Qty 90

## 2018-05-29 ENCOUNTER — TELEPHONE (OUTPATIENT)
Dept: INTERNAL MEDICINE | Facility: CLINIC | Age: 53
End: 2018-05-29

## 2018-05-29 RX ORDER — AMLODIPINE BESYLATE 10 MG/1
10 TABLET ORAL DAILY
Qty: 90 TABLET | Refills: 3 | Status: SHIPPED | OUTPATIENT
Start: 2018-05-29 | End: 2018-06-28 | Stop reason: SDUPTHER

## 2018-05-29 NOTE — TELEPHONE ENCOUNTER
----- Message from Walter Fish sent at 5/27/2018 10:38 AM EDT -----  Regarding: Prescription Question  Contact: [phone]  Can you call in a prescription for 10mg amlodipine very late for me.  Once a day.  I have increased my 5 to 10mg and I feel much better.    90 day script please.      Thanks Cole

## 2018-06-28 RX ORDER — AMLODIPINE BESYLATE 5 MG/1
TABLET ORAL
Qty: 90 TABLET | Refills: 3 | Status: SHIPPED | OUTPATIENT
Start: 2018-06-28 | End: 2019-02-18 | Stop reason: SDUPTHER

## 2018-06-28 RX ORDER — AMLODIPINE BESYLATE 10 MG/1
10 TABLET ORAL DAILY
Qty: 90 TABLET | Refills: 3 | Status: SHIPPED | OUTPATIENT
Start: 2018-06-28 | End: 2019-06-13

## 2018-11-20 ENCOUNTER — OFFICE VISIT (OUTPATIENT)
Dept: RETAIL CLINIC | Facility: CLINIC | Age: 53
End: 2018-11-20

## 2018-11-20 VITALS
SYSTOLIC BLOOD PRESSURE: 130 MMHG | HEART RATE: 84 BPM | DIASTOLIC BLOOD PRESSURE: 72 MMHG | OXYGEN SATURATION: 98 % | TEMPERATURE: 98.1 F | RESPIRATION RATE: 18 BRPM

## 2018-11-20 DIAGNOSIS — J01.40 ACUTE NON-RECURRENT PANSINUSITIS: Primary | ICD-10-CM

## 2018-11-20 PROCEDURE — 99213 OFFICE O/P EST LOW 20 MIN: CPT | Performed by: NURSE PRACTITIONER

## 2018-11-20 RX ORDER — AZITHROMYCIN 500 MG/1
500 TABLET, FILM COATED ORAL DAILY
Qty: 5 TABLET | Refills: 0 | Status: SHIPPED | OUTPATIENT
Start: 2018-11-20 | End: 2018-11-25

## 2018-11-20 NOTE — PROGRESS NOTES
"Subjective   Walter Fish is a 53 y.o. male.     URI    This is a new problem. The current episode started in the past 7 days. The problem has been gradually worsening. There has been no fever. Associated symptoms include congestion, coughing (worse at night time), ear pain, headaches, sinus pain and a sore throat. Pertinent negatives include no nausea. Associated symptoms comments: Mild soa at times when coughing  . He has tried decongestant (\"i get bronchitis and pneumonia easily\", tessalon pearls) for the symptoms. The treatment provided mild relief.        The following portions of the patient's history were reviewed and updated as appropriate: allergies, current medications, past family history, past medical history, past social history, past surgical history and problem list.    Review of Systems   Constitutional: Positive for fatigue.   HENT: Positive for congestion, ear pain and sore throat.    Respiratory: Positive for cough (worse at night time).    Cardiovascular: Negative.    Gastrointestinal: Negative.  Negative for nausea.   Skin: Negative.    Neurological: Positive for headache.       Objective   Physical Exam   Constitutional: He is cooperative. No distress.   HENT:   Head: Normocephalic.   Right Ear: Hearing, external ear and ear canal normal. A middle ear effusion is present.   Left Ear: Hearing, external ear and ear canal normal. A middle ear effusion is present.   Nose: Sinus tenderness present. Right sinus exhibits maxillary sinus tenderness. Left sinus exhibits maxillary sinus tenderness.   Mouth/Throat: Posterior oropharyngeal erythema present.   Eyes: Conjunctivae, EOM and lids are normal. Pupils are equal, round, and reactive to light.   Neck: Trachea normal and full passive range of motion without pain.   Cardiovascular: Normal rate, regular rhythm and normal pulses.   Pulmonary/Chest: Effort normal and breath sounds normal.   Lymphadenopathy:     He has cervical adenopathy.        Right " cervical: Superficial cervical adenopathy present.        Left cervical: Superficial cervical adenopathy present.   Neurological: He is alert.   Skin: Skin is warm. Capillary refill takes less than 2 seconds.   Psychiatric: He has a normal mood and affect. His speech is normal and behavior is normal.   Vitals reviewed.        Assessment/Plan   Walter was seen today for uri.    Diagnoses and all orders for this visit:    Acute non-recurrent pansinusitis    Other orders  -     azithromycin (ZITHROMAX) 500 MG tablet; Take 1 tablet by mouth Daily for 5 days.

## 2019-02-18 ENCOUNTER — OFFICE VISIT (OUTPATIENT)
Dept: FAMILY MEDICINE CLINIC | Facility: CLINIC | Age: 54
End: 2019-02-18

## 2019-02-18 VITALS
WEIGHT: 214.4 LBS | BODY MASS INDEX: 30.02 KG/M2 | TEMPERATURE: 98.5 F | SYSTOLIC BLOOD PRESSURE: 110 MMHG | HEART RATE: 100 BPM | HEIGHT: 71 IN | RESPIRATION RATE: 16 BRPM | OXYGEN SATURATION: 99 % | DIASTOLIC BLOOD PRESSURE: 72 MMHG

## 2019-02-18 DIAGNOSIS — F41.9 ANXIETY: ICD-10-CM

## 2019-02-18 DIAGNOSIS — I10 ESSENTIAL HYPERTENSION: Primary | ICD-10-CM

## 2019-02-18 PROCEDURE — 99214 OFFICE O/P EST MOD 30 MIN: CPT | Performed by: INTERNAL MEDICINE

## 2019-02-18 RX ORDER — LORAZEPAM 2 MG/1
TABLET ORAL
Qty: 90 TABLET | Refills: 0 | Status: SHIPPED | OUTPATIENT
Start: 2019-02-18 | End: 2019-07-18 | Stop reason: SDUPTHER

## 2019-02-18 RX ORDER — LANSOPRAZOLE 30 MG/1
30 CAPSULE, DELAYED RELEASE ORAL DAILY
Qty: 90 CAPSULE | Refills: 3 | Status: SHIPPED | OUTPATIENT
Start: 2019-02-18 | End: 2020-02-06

## 2019-02-19 NOTE — PROGRESS NOTES
Subjective   Walter Fish is a 54 y.o. male. Patient is here today for   Chief Complaint   Patient presents with   • Establish Care     NP   • Hypertension   • Heartburn          Vitals:    02/18/19 1338   BP: 110/72   Pulse: 100   Resp: 16   Temp: 98.5 °F (36.9 °C)   SpO2: 99%       Past Medical History:   Diagnosis Date   • Acid reflux    • Anxiety    • Hypertension       No Known Allergies   Social History     Socioeconomic History   • Marital status:      Spouse name: Not on file   • Number of children: Not on file   • Years of education: Not on file   • Highest education level: Not on file   Social Needs   • Financial resource strain: Not on file   • Food insecurity - worry: Not on file   • Food insecurity - inability: Not on file   • Transportation needs - medical: Not on file   • Transportation needs - non-medical: Not on file   Occupational History   • Not on file   Tobacco Use   • Smoking status: Never Smoker   • Smokeless tobacco: Never Used   Substance and Sexual Activity   • Alcohol use: No   • Drug use: No   • Sexual activity: Defer   Other Topics Concern   • Not on file   Social History Narrative   • Not on file        Current Outpatient Medications:   •  amLODIPine (NORVASC) 10 MG tablet, Take 1 tablet by mouth Daily., Disp: 90 tablet, Rfl: 3  •  lansoprazole (PREVACID) 30 MG capsule, Take 1 capsule by mouth Daily., Disp: 90 capsule, Rfl: 3  •  LORazepam (ATIVAN) 2 MG tablet, TAKE 1 TABLET BY MOUTH 3 TIME DAILY AS NEEDED, Disp: 90 tablet, Rfl: 0  •  losartan (COZAAR) 100 MG tablet, TAKE 1 TABLET BY MOUTH DAILY., Disp: 90 tablet, Rfl: 3     Objective     This pleasant 54-year-old male is new to my practice today.  He is here to establish care.  He has a history of hypertension and generalized anxiety disorder.             Review of Systems   Constitutional: Negative.    HENT: Negative.    Respiratory: Negative.    Cardiovascular: Negative.    Musculoskeletal: Negative.     Psychiatric/Behavioral: Negative.         He feels his generalized anxiety disorder is well controlled on lorazepam 2 mg 3 times daily.  He is taking this medication for many years.  He finds it to be effective.       Physical Exam   Constitutional: He is oriented to person, place, and time. He appears well-developed and well-nourished.   Pleasant, neatly groomed, BMI 30.   HENT:   Head: Normocephalic and atraumatic.   Cardiovascular: Normal rate and regular rhythm.   Pulmonary/Chest: Effort normal and breath sounds normal. No stridor. No respiratory distress. He has no wheezes.   Abdominal: Soft. Bowel sounds are normal.   Neurological: He is alert and oriented to person, place, and time.   Psychiatric: He has a normal mood and affect. His behavior is normal.   Nursing note and vitals reviewed.        Problem List Items Addressed This Visit        Cardiovascular and Mediastinum    BP (high blood pressure) - Primary       Other    Anxiety            PLAN  His hypertension appears to be well controlled.    He feels his generalized anxiety is well controlled lorazepam 2 mg 3 times daily.  I refilled this prescription for him.    I asked him to follow-up for a comprehensive physical examination once yearly.  I asked him to make that about 6 months down the road.  No Follow-up on file.

## 2019-06-13 ENCOUNTER — OFFICE VISIT (OUTPATIENT)
Dept: FAMILY MEDICINE CLINIC | Facility: CLINIC | Age: 54
End: 2019-06-13

## 2019-06-13 VITALS
RESPIRATION RATE: 16 BRPM | WEIGHT: 205 LBS | BODY MASS INDEX: 28.7 KG/M2 | DIASTOLIC BLOOD PRESSURE: 74 MMHG | HEART RATE: 80 BPM | HEIGHT: 71 IN | TEMPERATURE: 98 F | OXYGEN SATURATION: 98 % | SYSTOLIC BLOOD PRESSURE: 124 MMHG

## 2019-06-13 DIAGNOSIS — I10 ESSENTIAL HYPERTENSION: Primary | ICD-10-CM

## 2019-06-13 PROCEDURE — 99213 OFFICE O/P EST LOW 20 MIN: CPT | Performed by: INTERNAL MEDICINE

## 2019-06-13 RX ORDER — AMLODIPINE BESYLATE 2.5 MG/1
2.5 TABLET ORAL DAILY
Qty: 90 TABLET | Refills: 3 | Status: SHIPPED | OUTPATIENT
Start: 2019-06-13 | End: 2019-07-15

## 2019-06-13 NOTE — PROGRESS NOTES
Subjective   Walter Fish is a 54 y.o. male. Patient is here today for   Chief Complaint   Patient presents with   • Dizziness     patient has been dizziness lately- thinks it may be his  blood pressure           Vitals:    06/13/19 0808   BP: 124/74   Pulse: 80   Resp: 16   Temp: 98 °F (36.7 °C)   SpO2: 98%     The following portions of the patient's history were reviewed and updated as appropriate: allergies, current medications, past family history, past medical history, past social history, past surgical history and problem list.    Past Medical History:   Diagnosis Date   • Acid reflux    • Anxiety    • Hypertension       No Known Allergies   Social History     Socioeconomic History   • Marital status:      Spouse name: Not on file   • Number of children: Not on file   • Years of education: Not on file   • Highest education level: Not on file   Tobacco Use   • Smoking status: Never Smoker   • Smokeless tobacco: Never Used   Substance and Sexual Activity   • Alcohol use: No   • Drug use: No   • Sexual activity: Defer        Current Outpatient Medications:   •  lansoprazole (PREVACID) 30 MG capsule, Take 1 capsule by mouth Daily., Disp: 90 capsule, Rfl: 3  •  LORazepam (ATIVAN) 2 MG tablet, TAKE 1 TABLET BY MOUTH 3 TIME DAILY AS NEEDED, Disp: 90 tablet, Rfl: 0  •  losartan (COZAAR) 100 MG tablet, TAKE 1 TABLET BY MOUTH DAILY., Disp: 90 tablet, Rfl: 3  •  amLODIPine (NORVASC) 2.5 MG tablet, Take 1 tablet by mouth Daily., Disp: 90 tablet, Rfl: 3     Objective     History of Present Illness Walter has hypertension and is on losartan 100 mg daily and amlodipine 10 mg daily.  He takes both medicines in the morning.  He lost a lot of weight in the last 6 months.  He gets lightheaded a few hours after taking both medicines.  He checks his blood pressure at the pharmacy and has been getting systolic readings around 100.  He walks for exercise.    Review of Systems   Constitutional: Negative for activity change.    Respiratory: Negative.    Cardiovascular: Negative.    Neurological: Positive for dizziness and light-headedness.       Physical Exam   Constitutional: He appears well-developed and well-nourished.   Cardiovascular: Normal rate, regular rhythm and normal heart sounds.   107/70   Pulmonary/Chest: Effort normal and breath sounds normal.   Musculoskeletal: He exhibits no edema.   Psychiatric: He has a normal mood and affect. His behavior is normal. Judgment and thought content normal.   Vitals reviewed.      ASSESSMENT     Problem List Items Addressed This Visit        Cardiovascular and Mediastinum    BP (high blood pressure) - Primary    Relevant Medications    amLODIPine (NORVASC) 2.5 MG tablet          PLAN  Patient Instructions   Blood pressure is too low.  Will decrease amlodipine to 2.5 mg and continue losartan 100 mg daily.  If blood pressure is still low can discontinue amlodipine.    Return for Next scheduled follow up.

## 2019-06-13 NOTE — PATIENT INSTRUCTIONS
Blood pressure is too low.  Will decrease amlodipine to 2.5 mg and continue losartan 100 mg daily.  If blood pressure is still low can discontinue amlodipine.

## 2019-06-14 ENCOUNTER — TELEPHONE (OUTPATIENT)
Dept: FAMILY MEDICINE CLINIC | Facility: CLINIC | Age: 54
End: 2019-06-14

## 2019-06-14 NOTE — TELEPHONE ENCOUNTER
Pt saw Dr Ferris yesterday and he took care of it. Saw pt has an appointment with Dr. Chavez in August let pt know that Dr Chavez will need to take care of his problems per Dr. Joaquin. Pt is aware and understands        ----- Message from Clementina Abebe MA sent at 6/13/2019  8:31 AM EDT -----  I PRINTED THIS FOR DR. JOAQUIN YESTERDAY, HE HAS NOT ADDRESSED IT YET.       ----- Message -----  From: Ni Nicolas  Sent: 6/12/2019   8:28 AM  To: Clementina Abebe MA    PT HAS LOST 24 LBS AND BP IS RUNNING 101/72. FEELING A LITTLE DRAINED AND DIZZY. HE WANTED TO SEE IF HE NEEDS TO CUT BACK ON HIS BP MEDICATION. IF SO WANTED TO SEE IF NORVASC 5MG COULD BE CALLED IN FOR HIM.     PLEASE CALL PT BACK ABOUT THIS [phone]    [facility]    THANK YOU

## 2019-06-24 RX ORDER — LORAZEPAM 2 MG/1
TABLET ORAL
Qty: 90 TABLET | Refills: 2 | OUTPATIENT
Start: 2019-06-24

## 2019-07-05 ENCOUNTER — TELEPHONE (OUTPATIENT)
Dept: FAMILY MEDICINE CLINIC | Facility: CLINIC | Age: 54
End: 2019-07-05

## 2019-07-05 NOTE — TELEPHONE ENCOUNTER
PER DR. JOAQUIN HE WOULD LIKE HIM TO F/U WITH DR. DAVALOS ON THE 11TH AS SCHEDULED the patient STATED HAD DISCONTINUED THE MEDICATION, HOWEVER STARTED IT BACK UP WHEN HE STARTED TO EXPERIENCE HEADACHES. PT UNDERSTOOD.     ----- Message from Clementina Abebe MA sent at 7/5/2019  8:46 AM EDT -----  Contact: PATIENT  PATIENT SAID THAT HIS AMLODIPINE 2.5MG HAS BEEN CAUSING HIM TO SWEAT VERY BADLY AND CAUSES HIM DIZZINESS- AND HIS BP HAS BEEN RUNNING SLIGHTLY HIGH (140'S/HIGH 90'S) HE WANTS TO DISCUSS CHANGING THE AMLODIPINE TO SOMETHING ELSE. PLEASE CALL PT [phone]. THANK YOU.

## 2019-07-15 ENCOUNTER — OFFICE VISIT (OUTPATIENT)
Dept: FAMILY MEDICINE CLINIC | Facility: CLINIC | Age: 54
End: 2019-07-15

## 2019-07-15 VITALS
OXYGEN SATURATION: 98 % | SYSTOLIC BLOOD PRESSURE: 118 MMHG | HEIGHT: 70 IN | RESPIRATION RATE: 16 BRPM | WEIGHT: 207 LBS | TEMPERATURE: 97 F | DIASTOLIC BLOOD PRESSURE: 82 MMHG | BODY MASS INDEX: 29.63 KG/M2 | HEART RATE: 87 BPM

## 2019-07-15 DIAGNOSIS — I10 ESSENTIAL HYPERTENSION: Primary | ICD-10-CM

## 2019-07-15 PROCEDURE — 99213 OFFICE O/P EST LOW 20 MIN: CPT | Performed by: INTERNAL MEDICINE

## 2019-07-15 RX ORDER — LOSARTAN POTASSIUM AND HYDROCHLOROTHIAZIDE 12.5; 1 MG/1; MG/1
1 TABLET ORAL DAILY
Qty: 90 TABLET | Refills: 3 | Status: SHIPPED | OUTPATIENT
Start: 2019-07-15 | End: 2019-08-15 | Stop reason: ALTCHOICE

## 2019-07-15 NOTE — PROGRESS NOTES
Subjective   Walter Fish is a 54 y.o. male. Patient is here today for   Chief Complaint   Patient presents with   • Hypertension          Vitals:    07/15/19 1043   BP: 118/82   Pulse: 87   Resp: 16   Temp: 97 °F (36.1 °C)   SpO2: 98%     The following portions of the patient's history were reviewed and updated as appropriate: allergies, current medications, past family history, past medical history, past social history, past surgical history and problem list.    Past Medical History:   Diagnosis Date   • Acid reflux    • Anxiety    • Hypertension       Allergies   Allergen Reactions   • Poison Ivy Extract Hives      Social History     Socioeconomic History   • Marital status:      Spouse name: Not on file   • Number of children: Not on file   • Years of education: Not on file   • Highest education level: Not on file   Tobacco Use   • Smoking status: Never Smoker   • Smokeless tobacco: Never Used   Substance and Sexual Activity   • Alcohol use: No   • Drug use: No   • Sexual activity: Defer        Current Outpatient Medications:   •  lansoprazole (PREVACID) 30 MG capsule, Take 1 capsule by mouth Daily., Disp: 90 capsule, Rfl: 3  •  LORazepam (ATIVAN) 2 MG tablet, TAKE 1 TABLET BY MOUTH 3 TIME DAILY AS NEEDED, Disp: 90 tablet, Rfl: 0  •  losartan-hydrochlorothiazide (HYZAAR) 100-12.5 MG per tablet, Take 1 tablet by mouth Daily., Disp: 90 tablet, Rfl: 3     Objective     History of Present Illness Walter was seen 1 month ago and was noted to have low blood pressure.  He was on losartan 100 mg daily and amlodipine 10 mg daily.  Amlodipine was stopped and restarted at 2.5 mg daily.  He states that it is causing some nausea.  He checks his blood pressure at the pharmacy and reports elevated readings.  He eats healthy and exercises regularly.  He denies any stress at present.  He has not had any lab work since 2017.  His LDL cholesterol has been mildly elevated in the past.    Review of Systems    Constitutional: Negative.    Respiratory: Negative.    Cardiovascular:        As in HPI   Neurological: Negative.    Psychiatric/Behavioral: Negative.        Physical Exam   Constitutional: He appears well-developed and well-nourished.   Cardiovascular: Normal rate, regular rhythm and normal heart sounds.   130/98,132/90   Musculoskeletal: He exhibits no edema.   Psychiatric: He has a normal mood and affect. His behavior is normal. Judgment and thought content normal.   Vitals reviewed.      ASSESSMENT     Problem List Items Addressed This Visit        Cardiovascular and Mediastinum    BP (high blood pressure) - Primary    Relevant Medications    losartan-hydrochlorothiazide (HYZAAR) 100-12.5 MG per tablet          PLAN  Patient Instructions   Blood pressure is elevated.  Will switch to losartan-HCTZ 100-12.5 mg daily.  Continue diet and exercise.    Return in about 1 month (around 8/15/2019) for labs CBC, CMP, lipid, TSH, urinalysis, PSA.

## 2019-07-15 NOTE — PATIENT INSTRUCTIONS
Blood pressure is elevated.  Will switch to losartan-HCTZ 100-12.5 mg daily.  Continue diet and exercise.

## 2019-07-17 ENCOUNTER — TELEPHONE (OUTPATIENT)
Dept: FAMILY MEDICINE CLINIC | Facility: CLINIC | Age: 54
End: 2019-07-17

## 2019-07-17 RX ORDER — LORAZEPAM 2 MG/1
TABLET ORAL
Qty: 90 TABLET | Refills: 2 | Status: CANCELLED | OUTPATIENT
Start: 2019-07-17

## 2019-07-17 NOTE — TELEPHONE ENCOUNTER
PT DISAGREED WITH WHAT DR. MARIA HAD TO SAY AND STATES HE KNOWS ITS THE LISINOPRIL HCTZ IS THE CAUSE OF HIS SWEATING AND IS GOING BACK ON HIS LOSARTAN DESPITE ME OFFERING HIM AN APPOINTMENT TO CHANGE HIS MEDICATION HE REFUSED AND STATED HE WILL GO BACK ON LOSARTAN AND NOT SCHEDULE AN APPOINTMENT.

## 2019-07-17 NOTE — TELEPHONE ENCOUNTER
PT CALLED IN TODAY STATING HE IS SWEATING PROFUSELY AND HAS GONE THROUGH 4 SHIRTS AND BEEN PEEING LIKE CRAZY. HE IS ON LISINOPRIL HCTZ. UPON SPEAKING TO DR. MARIA.  STATES UNLESS HE IS RUNNING A FEVER, WHICH HE NEEDS TO CHECK HIS TEMPERATURE HE SHOULD NOT BE SWEATING, HOWEVER THE PEEING IS NORMAL AS THE HCTZ IS A WATER PILL. CONTINUE TO DRINK PLENTY OF FLUIDS AND HE IS OK AND WON'T GET DEHYDRATED. HE STATES IN PREVIOUS CONVERSATION AMLODIPINE 2.5MG WAS CAUSING HIM TO SWEAT AS WELL.

## 2019-07-18 RX ORDER — LORAZEPAM 2 MG/1
TABLET ORAL
Qty: 90 TABLET | Refills: 0 | Status: SHIPPED | OUTPATIENT
Start: 2019-07-18 | End: 2019-08-15

## 2019-07-25 ENCOUNTER — OFFICE VISIT (OUTPATIENT)
Dept: RETAIL CLINIC | Facility: CLINIC | Age: 54
End: 2019-07-25

## 2019-07-25 VITALS
OXYGEN SATURATION: 98 % | DIASTOLIC BLOOD PRESSURE: 64 MMHG | RESPIRATION RATE: 18 BRPM | HEART RATE: 84 BPM | SYSTOLIC BLOOD PRESSURE: 118 MMHG | TEMPERATURE: 98.2 F

## 2019-07-25 DIAGNOSIS — J01.10 ACUTE FRONTAL SINUSITIS, RECURRENCE NOT SPECIFIED: Primary | ICD-10-CM

## 2019-07-25 PROCEDURE — 99213 OFFICE O/P EST LOW 20 MIN: CPT | Performed by: NURSE PRACTITIONER

## 2019-07-25 RX ORDER — AMOXICILLIN AND CLAVULANATE POTASSIUM 875; 125 MG/1; MG/1
1 TABLET, FILM COATED ORAL 2 TIMES DAILY
Qty: 20 TABLET | Refills: 0 | Status: SHIPPED | OUTPATIENT
Start: 2019-07-25 | End: 2019-08-04

## 2019-07-25 RX ORDER — AMLODIPINE BESYLATE 5 MG/1
2.5 TABLET ORAL DAILY
COMMUNITY
End: 2019-08-15

## 2019-07-25 RX ORDER — LOSARTAN POTASSIUM 25 MG/1
25 TABLET ORAL DAILY
COMMUNITY
End: 2019-08-15 | Stop reason: DRUGHIGH

## 2019-07-25 NOTE — PATIENT INSTRUCTIONS

## 2019-07-25 NOTE — PROGRESS NOTES
Subjective   Walter Fish is a 54 y.o. male.     URI    This is a new problem. The current episode started in the past 7 days (4-5 days). The problem has been gradually worsening. Associated symptoms include congestion, ear pain (feel full), headaches and rhinorrhea. Pertinent negatives include no nausea, sore throat or vomiting. Associated symptoms comments: Dizziness, post nasal drip . He has tried antihistamine for the symptoms. The treatment provided no relief.        The following portions of the patient's history were reviewed and updated as appropriate: allergies, current medications, past family history, past medical history, past social history, past surgical history and problem list.    Review of Systems   HENT: Positive for congestion, ear pain (feel full) and rhinorrhea. Negative for sore throat.    Cardiovascular: Negative.    Gastrointestinal: Negative.  Negative for nausea and vomiting.   Musculoskeletal: Negative.    Neurological: Positive for headache.       Objective   Physical Exam   Constitutional: He is cooperative. No distress.   HENT:   Head: Normocephalic.   Right Ear: Hearing, external ear and ear canal normal. A middle ear effusion is present.   Left Ear: Hearing, external ear and ear canal normal. A middle ear effusion is present.   Nose: Mucosal edema, rhinorrhea and sinus tenderness present. Right sinus exhibits maxillary sinus tenderness. Left sinus exhibits maxillary sinus tenderness.   Mouth/Throat: Posterior oropharyngeal erythema present.   Eyes: Conjunctivae, EOM and lids are normal. Pupils are equal, round, and reactive to light.   Neck: Trachea normal and full passive range of motion without pain.   Cardiovascular: Normal rate, regular rhythm and normal pulses.   Pulmonary/Chest: Effort normal and breath sounds normal.   Lymphadenopathy:     He has cervical adenopathy.        Right cervical: Superficial cervical adenopathy present.        Left cervical: Superficial cervical  adenopathy present.   Neurological: He is alert.   Skin: Skin is warm. Capillary refill takes less than 2 seconds.   Psychiatric: He has a normal mood and affect. His speech is normal and behavior is normal.   Vitals reviewed.        Assessment/Plan   Walter was seen today for uri.    Diagnoses and all orders for this visit:    Acute frontal sinusitis, recurrence not specified    Other orders  -     amoxicillin-clavulanate (AUGMENTIN) 875-125 MG per tablet; Take 1 tablet by mouth 2 (Two) Times a Day for 10 days.

## 2019-08-15 ENCOUNTER — APPOINTMENT (OUTPATIENT)
Dept: GENERAL RADIOLOGY | Facility: HOSPITAL | Age: 54
End: 2019-08-15

## 2019-08-15 ENCOUNTER — HOSPITAL ENCOUNTER (OUTPATIENT)
Facility: HOSPITAL | Age: 54
Setting detail: OBSERVATION
Discharge: HOME | End: 2019-08-16
Attending: EMERGENCY MEDICINE | Admitting: INTERNAL MEDICINE

## 2019-08-15 DIAGNOSIS — N17.9 ACUTE KIDNEY INJURY (HCC): Primary | ICD-10-CM

## 2019-08-15 DIAGNOSIS — D72.829 LEUKOCYTOSIS, UNSPECIFIED TYPE: ICD-10-CM

## 2019-08-15 LAB
ALBUMIN SERPL-MCNC: 4.8 G/DL (ref 3.5–5.2)
ALBUMIN/GLOB SERPL: 1.6 G/DL
ALP SERPL-CCNC: 92 U/L (ref 39–117)
ALT SERPL W P-5'-P-CCNC: 22 U/L (ref 1–41)
ANION GAP SERPL CALCULATED.3IONS-SCNC: 18.4 MMOL/L (ref 5–15)
AST SERPL-CCNC: 18 U/L (ref 1–40)
BACTERIA UR QL AUTO: ABNORMAL /HPF
BASOPHILS # BLD AUTO: 0.11 10*3/MM3 (ref 0–0.2)
BASOPHILS NFR BLD AUTO: 0.5 % (ref 0–1.5)
BILIRUB SERPL-MCNC: 1 MG/DL (ref 0.2–1.2)
BILIRUB UR QL STRIP: NEGATIVE
BUN BLD-MCNC: 20 MG/DL (ref 6–20)
BUN/CREAT SERPL: 8.9 (ref 7–25)
CALCIUM SPEC-SCNC: 10.2 MG/DL (ref 8.6–10.5)
CHLORIDE SERPL-SCNC: 102 MMOL/L (ref 98–107)
CK SERPL-CCNC: 74 U/L (ref 20–200)
CLARITY UR: CLEAR
CO2 SERPL-SCNC: 21.6 MMOL/L (ref 22–29)
COLOR UR: YELLOW
CREAT BLD-MCNC: 2.25 MG/DL (ref 0.76–1.27)
DEPRECATED RDW RBC AUTO: 40.6 FL (ref 37–54)
EOSINOPHIL # BLD AUTO: 0.03 10*3/MM3 (ref 0–0.4)
EOSINOPHIL NFR BLD AUTO: 0.1 % (ref 0.3–6.2)
ERYTHROCYTE [DISTWIDTH] IN BLOOD BY AUTOMATED COUNT: 12.4 % (ref 12.3–15.4)
GFR SERPL CREATININE-BSD FRML MDRD: 30 ML/MIN/1.73
GLOBULIN UR ELPH-MCNC: 3 GM/DL
GLUCOSE BLD-MCNC: 79 MG/DL (ref 65–99)
GLUCOSE UR STRIP-MCNC: NEGATIVE MG/DL
HCT VFR BLD AUTO: 51.6 % (ref 37.5–51)
HGB BLD-MCNC: 17.1 G/DL (ref 13–17.7)
HGB UR QL STRIP.AUTO: NEGATIVE
HOLD SPECIMEN: NORMAL
HOLD SPECIMEN: NORMAL
HYALINE CASTS UR QL AUTO: ABNORMAL /LPF
IMM GRANULOCYTES # BLD AUTO: 0.19 10*3/MM3 (ref 0–0.05)
IMM GRANULOCYTES NFR BLD AUTO: 0.9 % (ref 0–0.5)
KETONES UR QL STRIP: NEGATIVE
LEUKOCYTE ESTERASE UR QL STRIP.AUTO: NEGATIVE
LYMPHOCYTES # BLD AUTO: 2.11 10*3/MM3 (ref 0.7–3.1)
LYMPHOCYTES NFR BLD AUTO: 10.3 % (ref 19.6–45.3)
MCH RBC QN AUTO: 29.4 PG (ref 26.6–33)
MCHC RBC AUTO-ENTMCNC: 33.1 G/DL (ref 31.5–35.7)
MCV RBC AUTO: 88.7 FL (ref 79–97)
MONOCYTES # BLD AUTO: 1.6 10*3/MM3 (ref 0.1–0.9)
MONOCYTES NFR BLD AUTO: 7.8 % (ref 5–12)
NEUTROPHILS # BLD AUTO: 16.42 10*3/MM3 (ref 1.7–7)
NEUTROPHILS NFR BLD AUTO: 80.4 % (ref 42.7–76)
NITRITE UR QL STRIP: NEGATIVE
NRBC BLD AUTO-RTO: 0 /100 WBC (ref 0–0.2)
PH UR STRIP.AUTO: 5.5 [PH] (ref 5–8)
PLATELET # BLD AUTO: 392 10*3/MM3 (ref 140–450)
PMV BLD AUTO: 9.1 FL (ref 6–12)
POTASSIUM BLD-SCNC: 3.8 MMOL/L (ref 3.5–5.2)
PROT SERPL-MCNC: 7.8 G/DL (ref 6–8.5)
PROT UR QL STRIP: ABNORMAL
RBC # BLD AUTO: 5.82 10*6/MM3 (ref 4.14–5.8)
RBC # UR: ABNORMAL /HPF
REF LAB TEST METHOD: ABNORMAL
SODIUM BLD-SCNC: 142 MMOL/L (ref 136–145)
SP GR UR STRIP: 1.01 (ref 1–1.03)
SPERM URNS QL MICRO: ABNORMAL /HPF
SQUAMOUS #/AREA URNS HPF: ABNORMAL /HPF
TROPONIN T SERPL-MCNC: <0.01 NG/ML (ref 0–0.03)
UROBILINOGEN UR QL STRIP: ABNORMAL
WBC NRBC COR # BLD: 20.46 10*3/MM3 (ref 3.4–10.8)
WBC UR QL AUTO: ABNORMAL /HPF
WHOLE BLOOD HOLD SPECIMEN: NORMAL
WHOLE BLOOD HOLD SPECIMEN: NORMAL

## 2019-08-15 PROCEDURE — 82550 ASSAY OF CK (CPK): CPT | Performed by: EMERGENCY MEDICINE

## 2019-08-15 PROCEDURE — G0378 HOSPITAL OBSERVATION PER HR: HCPCS

## 2019-08-15 PROCEDURE — 84484 ASSAY OF TROPONIN QUANT: CPT | Performed by: PHYSICIAN ASSISTANT

## 2019-08-15 PROCEDURE — 80053 COMPREHEN METABOLIC PANEL: CPT | Performed by: PHYSICIAN ASSISTANT

## 2019-08-15 PROCEDURE — 99285 EMERGENCY DEPT VISIT HI MDM: CPT

## 2019-08-15 PROCEDURE — 85025 COMPLETE CBC W/AUTO DIFF WBC: CPT | Performed by: PHYSICIAN ASSISTANT

## 2019-08-15 PROCEDURE — 71045 X-RAY EXAM CHEST 1 VIEW: CPT

## 2019-08-15 PROCEDURE — 93005 ELECTROCARDIOGRAM TRACING: CPT

## 2019-08-15 PROCEDURE — 93010 ELECTROCARDIOGRAM REPORT: CPT | Performed by: INTERNAL MEDICINE

## 2019-08-15 PROCEDURE — 81001 URINALYSIS AUTO W/SCOPE: CPT | Performed by: PHYSICIAN ASSISTANT

## 2019-08-15 PROCEDURE — 93005 ELECTROCARDIOGRAM TRACING: CPT | Performed by: EMERGENCY MEDICINE

## 2019-08-15 RX ORDER — AMLODIPINE BESYLATE 2.5 MG/1
2.5 TABLET ORAL DAILY
COMMUNITY
End: 2019-08-16 | Stop reason: HOSPADM

## 2019-08-15 RX ORDER — LOSARTAN POTASSIUM 100 MG/1
100 TABLET ORAL DAILY
COMMUNITY
End: 2019-08-16 | Stop reason: HOSPADM

## 2019-08-15 RX ORDER — LORAZEPAM 2 MG/1
4 TABLET ORAL NIGHTLY
COMMUNITY
End: 2019-09-06 | Stop reason: SDUPTHER

## 2019-08-15 RX ADMIN — SODIUM CHLORIDE, POTASSIUM CHLORIDE, SODIUM LACTATE AND CALCIUM CHLORIDE 1000 ML: 600; 310; 30; 20 INJECTION, SOLUTION INTRAVENOUS at 20:27

## 2019-08-15 NOTE — ED PROVIDER NOTES
"EMERGENCY DEPARTMENT ENCOUNTER    Room Number:  25/25  Date seen:  8/15/2019  Time seen: 7:07 PM  PCP: Provider, No Known    HPI:  Chief complaint: dizzy  Context:Walter Fish is a 54 y.o. male who presents to the ED with c/o an episode of dizziness while on the golf course today.  He states he had been golfing for approximately 1 hour when he started feeling lightheaded, nauseated and broke out in a sweat.  Friends stated to him that he was \"pale as a sheet.\"  He said the episode lasted for about 45 minutes.  He [place] to the club house and did not want to make a scene so he did not lie down and over the course of that time he did start to feel better.  He feels a little fatigued, is otherwise asymptomatic now.  He denies having had any chest pain, shortness of breath, focal numbness or weakness.  He states he recently lost 26 pounds and his amlodipine for hypertension was causing hypotension, so he was changed to a diuretic which he did not tolerate due to frequent urination.  He was placed back on a lower dose of amlodipine and takes 2.5 mg daily.    Onset: acute  Location:generalized  Radiation: none  Duration: 45 minutes  Timing: episodic  Character: \"dizzy\"  Aggravating Factors: none  Alleviating Factors: rest, time  Severity: moderate    MEDICAL RECORD REVIEW  Pt's previous medical records show pt was seen here in the ER on 12/17/2017 and dx with N/V/D and gastroenteritis.     ALLERGIES  Poison ivy extract    PAST MEDICAL HISTORY  Active Ambulatory Problems     Diagnosis Date Noted   • Anxiety 02/26/2016   • Insomnia, persistent 02/26/2016   • Acid reflux 02/26/2016   • Elevated cholesterol 02/26/2016   • BP (high blood pressure) 02/26/2016   • Status post cholecystectomy 02/24/2017     Resolved Ambulatory Problems     Diagnosis Date Noted   • Abdominal pain, acute, right upper quadrant 01/31/2017     Past Medical History:   Diagnosis Date   • Acid reflux    • Anxiety    • Hypertension        PAST SURGICAL " HISTORY  Past Surgical History:   Procedure Laterality Date   • ANAL FISSURECTOMY     • ANKLE SURGERY Right    • CHOLECYSTECTOMY N/A 2/3/2017    Procedure: CHOLECYSTECTOMY LAPAROSCOPIC;  Surgeon: Dakota Mckeon MD;  Location: [facility];  Service:    • HAND SURGERY Right    • NASAL SINUS SURGERY         FAMILY HISTORY  Family History   Problem Relation Age of Onset   • Heart disease Mother    • Hypertension Mother    • Thyroid disease Mother    • Depression Mother    • Heart disease Father    • Depression Father        SOCIAL HISTORY  Social History     Socioeconomic History   • Marital status:      Spouse name: Not on file   • Number of children: Not on file   • Years of education: Not on file   • Highest education level: Not on file   Tobacco Use   • Smoking status: Never Smoker   • Smokeless tobacco: Never Used   Substance and Sexual Activity   • Alcohol use: No   • Drug use: No   • Sexual activity: Defer       REVIEW OF SYSTEMS  Review of Systems   Constitutional: Positive for fatigue. Negative for chills and fever.   HENT: Negative.    Eyes: Negative.    Respiratory: Negative for shortness of breath.    Cardiovascular: Negative for chest pain and leg swelling.   Gastrointestinal: Positive for nausea. Negative for abdominal pain, diarrhea and vomiting.   Genitourinary: Negative.    Musculoskeletal: Negative.    Skin: Positive for pallor.   Neurological: Positive for dizziness and light-headedness. Negative for numbness.   Psychiatric/Behavioral: Negative.        PHYSICAL EXAM  ED Triage Vitals   Temp Heart Rate Resp BP SpO2   08/15/19 1855 08/15/19 1855 08/15/19 1855 08/15/19 1906 08/15/19 1855   96.7 °F (35.9 °C) (!) 127 20 109/77 98 %      Temp src Heart Rate Source Patient Position BP Location FiO2 (%)   08/15/19 1855 -- 08/15/19 1906 08/15/19 1906 --   Tympanic  Lying Right arm      Physical Exam   Constitutional: He is oriented to person, place, and time and well-developed, well-nourished, and  in no distress.   HENT:   Head: Normocephalic and atraumatic.   Right Ear: External ear normal.   Left Ear: External ear normal.   Nose: Nose normal.   Mouth/Throat: Oropharynx is clear and moist.   Eyes: Conjunctivae and EOM are normal. Pupils are equal, round, and reactive to light.   Neck: Normal range of motion. Neck supple.   Cardiovascular: Normal rate and regular rhythm.   HR 92, elevated to 113 when the patient sat up for lung auscultation   Pulmonary/Chest: Effort normal and breath sounds normal. No respiratory distress. He has no wheezes. He has no rales.   Abdominal: Soft. He exhibits no distension. There is no tenderness.   Musculoskeletal: Normal range of motion. He exhibits no edema.   Neurological: He is alert and oriented to person, place, and time. GCS score is 15.   Skin: Skin is warm and dry.   Psychiatric: Affect normal.   Nursing note and vitals reviewed.      LAB RESULTS  Recent Results (from the past 24 hour(s))   Light Blue Top    Collection Time: 08/15/19  7:06 PM   Result Value Ref Range    Extra Tube hold for add-on    Green Top (Gel)    Collection Time: 08/15/19  7:06 PM   Result Value Ref Range    Extra Tube Hold for add-ons.    Lavender Top    Collection Time: 08/15/19  7:06 PM   Result Value Ref Range    Extra Tube hold for add-on    Gold Top - SST    Collection Time: 08/15/19  7:06 PM   Result Value Ref Range    Extra Tube Hold for add-ons.    Comprehensive Metabolic Panel    Collection Time: 08/15/19  7:06 PM   Result Value Ref Range    Glucose 79 65 - 99 mg/dL    BUN 20 6 - 20 mg/dL    Creatinine 2.25 (H) 0.76 - 1.27 mg/dL    Sodium 142 136 - 145 mmol/L    Potassium 3.8 3.5 - 5.2 mmol/L    Chloride 102 98 - 107 mmol/L    CO2 21.6 (L) 22.0 - 29.0 mmol/L    Calcium 10.2 8.6 - 10.5 mg/dL    Total Protein 7.8 6.0 - 8.5 g/dL    Albumin 4.80 3.50 - 5.20 g/dL    ALT (SGPT) 22 1 - 41 U/L    AST (SGOT) 18 1 - 40 U/L    Alkaline Phosphatase 92 39 - 117 U/L    Total Bilirubin 1.0 0.2 - 1.2  mg/dL    eGFR Non African Amer 30 (L) >60 mL/min/1.73    Globulin 3.0 gm/dL    A/G Ratio 1.6 g/dL    BUN/Creatinine Ratio 8.9 7.0 - 25.0    Anion Gap 18.4 (H) 5.0 - 15.0 mmol/L   Troponin    Collection Time: 08/15/19  7:06 PM   Result Value Ref Range    Troponin T <0.010 0.000 - 0.030 ng/mL   CBC Auto Differential    Collection Time: 08/15/19  7:06 PM   Result Value Ref Range    WBC 20.46 (H) 3.40 - 10.80 10*3/mm3    RBC 5.82 (H) 4.14 - 5.80 10*6/mm3    Hemoglobin 17.1 13.0 - 17.7 g/dL    Hematocrit 51.6 (H) 37.5 - 51.0 %    MCV 88.7 79.0 - 97.0 fL    MCH 29.4 26.6 - 33.0 pg    MCHC 33.1 31.5 - 35.7 g/dL    RDW 12.4 12.3 - 15.4 %    RDW-SD 40.6 37.0 - 54.0 fl    MPV 9.1 6.0 - 12.0 fL    Platelets 392 140 - 450 10*3/mm3    Neutrophil % 80.4 (H) 42.7 - 76.0 %    Lymphocyte % 10.3 (L) 19.6 - 45.3 %    Monocyte % 7.8 5.0 - 12.0 %    Eosinophil % 0.1 (L) 0.3 - 6.2 %    Basophil % 0.5 0.0 - 1.5 %    Immature Grans % 0.9 (H) 0.0 - 0.5 %    Neutrophils, Absolute 16.42 (H) 1.70 - 7.00 10*3/mm3    Lymphocytes, Absolute 2.11 0.70 - 3.10 10*3/mm3    Monocytes, Absolute 1.60 (H) 0.10 - 0.90 10*3/mm3    Eosinophils, Absolute 0.03 0.00 - 0.40 10*3/mm3    Basophils, Absolute 0.11 0.00 - 0.20 10*3/mm3    Immature Grans, Absolute 0.19 (H) 0.00 - 0.05 10*3/mm3    nRBC 0.0 0.0 - 0.2 /100 WBC   CK    Collection Time: 08/15/19  7:06 PM   Result Value Ref Range    Creatine Kinase 74 20 - 200 U/L   Urinalysis With Microscopic If Indicated (No Culture) - Urine, Clean Catch    Collection Time: 08/15/19  9:36 PM   Result Value Ref Range    Color, UA Yellow Yellow, Straw    Appearance, UA Clear Clear    pH, UA 5.5 5.0 - 8.0    Specific Gravity, UA 1.010 1.005 - 1.030    Glucose, UA Negative Negative    Ketones, UA Negative Negative    Bilirubin, UA Negative Negative    Blood, UA Negative Negative    Protein, UA 30 mg/dL (1+) (A) Negative    Leuk Esterase, UA Negative Negative    Nitrite, UA Negative Negative    Urobilinogen, UA 0.2 E.U./dL 0.2  - 1.0 E.U./dL   Urinalysis, Microscopic Only - Urine, Clean Catch    Collection Time: 08/15/19  9:36 PM   Result Value Ref Range    RBC, UA None Seen None Seen, 0-2 /HPF    WBC, UA None Seen None Seen, 0-2 /HPF    Bacteria, UA None Seen None Seen /HPF    Squamous Epithelial Cells, UA None Seen None Seen, 0-2 /HPF    Hyaline Casts, UA None Seen None Seen /LPF    Sperm, UA Small/1+ (A) None Seen /HPF    Methodology Manual Light Microscopy        I ordered the above labs and reviewed the results    RADIOLOGY  XR Chest 1 View   Final Result   No evidence for acute pulmonary process. Follow-up as   clinical indications persist.       This report was finalized on 8/15/2019 7:55 PM by Dr. Randell Matute M.D.              I ordered the above noted radiological studies and reviewed the images on the PACS system.       MEDICATIONS GIVEN IN ER  Medications   lactated ringers bolus 1,000 mL (1,000 mL Intravenous New Bag 8/15/19 2027)       EKG    EKG          EKG time: 1910  Rhythm/Rate: sinus tach, 100  P waves and KS: normal   QRS, axis: normal   ST and T waves: non-specific T wave inversion in lead 3     Interpreted Contemporaneously by me, independently viewed  No prior for comparison.        PROGRESS AND CONSULTS    Progress Notes:       1912  Ordered CXR and troponin for further evaluation.     1934  Ordered UA for further evaluation.     2003  Ordered lactated ringers bolus for hydration.     2020  Rechecked pt. Pt is resting comfortably. Notified pt of lab results which revealed elevated WBC (20.46) and elevated creatinine (2.25). CXR was negative. Discussed the plan to admit for further treatment. Pt understands and agrees with the plan, all questions answered.    2150  Reviewed pt's history and workup with Dr. Gill (ER physician). After a bedside evaluation; Dr. Gill agrees with the plan of care.    2204  Placed a call to [facility].    2210  Discussed pt case with Dr. Dsouza ([facility]) who agrees to admit pt.  "      Disposition vitals:  /82 (BP Location: Right arm, Patient Position: Sitting)   Pulse 77   Temp 96.7 °F (35.9 °C) (Tympanic)   Resp 18   Ht 177.8 cm (70\")   Wt 94.6 kg (208 lb 9.6 oz)   SpO2 100%   BMI 29.93 kg/m²       DIAGNOSIS  Final diagnoses:   Acute kidney injury (CMS/HCC)   Leukocytosis, unspecified type         Documentation assistance provided by claudette Greene for Pilar Theodore PA-C.  Information recorded by the scribe was done at my direction and has been verified and validated by me.         Bhakti Greene  08/15/19 5885       Pilar Theodore PA  08/15/19 7099    "

## 2019-08-16 VITALS
WEIGHT: 204.4 LBS | TEMPERATURE: 98.1 F | HEIGHT: 70 IN | HEART RATE: 85 BPM | DIASTOLIC BLOOD PRESSURE: 79 MMHG | RESPIRATION RATE: 16 BRPM | SYSTOLIC BLOOD PRESSURE: 121 MMHG | OXYGEN SATURATION: 96 % | BODY MASS INDEX: 29.26 KG/M2

## 2019-08-16 PROBLEM — R42 DIZZINESS: Status: ACTIVE | Noted: 2019-08-16

## 2019-08-16 PROBLEM — D72.829 LEUKOCYTOSIS: Status: ACTIVE | Noted: 2019-08-16

## 2019-08-16 LAB
ANION GAP SERPL CALCULATED.3IONS-SCNC: 11.5 MMOL/L (ref 5–15)
BUN BLD-MCNC: 21 MG/DL (ref 6–20)
BUN/CREAT SERPL: 15.6 (ref 7–25)
CALCIUM SPEC-SCNC: 8.8 MG/DL (ref 8.6–10.5)
CHLORIDE SERPL-SCNC: 102 MMOL/L (ref 98–107)
CO2 SERPL-SCNC: 25.5 MMOL/L (ref 22–29)
CREAT BLD-MCNC: 1.35 MG/DL (ref 0.76–1.27)
DEPRECATED RDW RBC AUTO: 40.1 FL (ref 37–54)
ERYTHROCYTE [DISTWIDTH] IN BLOOD BY AUTOMATED COUNT: 12.3 % (ref 12.3–15.4)
GFR SERPL CREATININE-BSD FRML MDRD: 55 ML/MIN/1.73
GLUCOSE BLD-MCNC: 104 MG/DL (ref 65–99)
HCT VFR BLD AUTO: 45.4 % (ref 37.5–51)
HGB BLD-MCNC: 14.7 G/DL (ref 13–17.7)
MCH RBC QN AUTO: 28.7 PG (ref 26.6–33)
MCHC RBC AUTO-ENTMCNC: 32.4 G/DL (ref 31.5–35.7)
MCV RBC AUTO: 88.7 FL (ref 79–97)
PLATELET # BLD AUTO: 327 10*3/MM3 (ref 140–450)
PMV BLD AUTO: 9.2 FL (ref 6–12)
POTASSIUM BLD-SCNC: 3.7 MMOL/L (ref 3.5–5.2)
RBC # BLD AUTO: 5.12 10*6/MM3 (ref 4.14–5.8)
SODIUM BLD-SCNC: 139 MMOL/L (ref 136–145)
WBC NRBC COR # BLD: 12.76 10*3/MM3 (ref 3.4–10.8)

## 2019-08-16 PROCEDURE — 85027 COMPLETE CBC AUTOMATED: CPT | Performed by: NURSE PRACTITIONER

## 2019-08-16 PROCEDURE — 96360 HYDRATION IV INFUSION INIT: CPT

## 2019-08-16 PROCEDURE — G0378 HOSPITAL OBSERVATION PER HR: HCPCS

## 2019-08-16 PROCEDURE — 80048 BASIC METABOLIC PNL TOTAL CA: CPT | Performed by: NURSE PRACTITIONER

## 2019-08-16 PROCEDURE — 96361 HYDRATE IV INFUSION ADD-ON: CPT

## 2019-08-16 RX ORDER — SODIUM CHLORIDE 0.9 % (FLUSH) 0.9 %
3 SYRINGE (ML) INJECTION EVERY 12 HOURS SCHEDULED
Status: DISCONTINUED | OUTPATIENT
Start: 2019-08-16 | End: 2019-08-16 | Stop reason: HOSPADM

## 2019-08-16 RX ORDER — LORAZEPAM 1 MG/1
2 TABLET ORAL NIGHTLY
Status: DISCONTINUED | OUTPATIENT
Start: 2019-08-16 | End: 2019-08-16 | Stop reason: HOSPADM

## 2019-08-16 RX ORDER — BISACODYL 5 MG/1
5 TABLET, DELAYED RELEASE ORAL DAILY PRN
Status: DISCONTINUED | OUTPATIENT
Start: 2019-08-16 | End: 2019-08-16 | Stop reason: HOSPADM

## 2019-08-16 RX ORDER — ACETAMINOPHEN 160 MG/5ML
650 SOLUTION ORAL EVERY 4 HOURS PRN
Status: DISCONTINUED | OUTPATIENT
Start: 2019-08-16 | End: 2019-08-16 | Stop reason: HOSPADM

## 2019-08-16 RX ORDER — SODIUM CHLORIDE 9 MG/ML
125 INJECTION, SOLUTION INTRAVENOUS CONTINUOUS
Status: DISCONTINUED | OUTPATIENT
Start: 2019-08-16 | End: 2019-08-16 | Stop reason: HOSPADM

## 2019-08-16 RX ORDER — CALCIUM CARBONATE 200(500)MG
2 TABLET,CHEWABLE ORAL 3 TIMES DAILY PRN
Status: DISCONTINUED | OUTPATIENT
Start: 2019-08-16 | End: 2019-08-16 | Stop reason: HOSPADM

## 2019-08-16 RX ORDER — SODIUM CHLORIDE 0.9 % (FLUSH) 0.9 %
3-10 SYRINGE (ML) INJECTION AS NEEDED
Status: DISCONTINUED | OUTPATIENT
Start: 2019-08-16 | End: 2019-08-16 | Stop reason: HOSPADM

## 2019-08-16 RX ORDER — ONDANSETRON 2 MG/ML
4 INJECTION INTRAMUSCULAR; INTRAVENOUS EVERY 6 HOURS PRN
Status: DISCONTINUED | OUTPATIENT
Start: 2019-08-16 | End: 2019-08-16 | Stop reason: HOSPADM

## 2019-08-16 RX ORDER — ACETAMINOPHEN 325 MG/1
650 TABLET ORAL EVERY 4 HOURS PRN
Start: 2019-08-16 | End: 2019-10-10

## 2019-08-16 RX ORDER — PANTOPRAZOLE SODIUM 40 MG/1
40 TABLET, DELAYED RELEASE ORAL
Status: DISCONTINUED | OUTPATIENT
Start: 2019-08-16 | End: 2019-08-16 | Stop reason: HOSPADM

## 2019-08-16 RX ORDER — ACETAMINOPHEN 325 MG/1
650 TABLET ORAL EVERY 4 HOURS PRN
Status: DISCONTINUED | OUTPATIENT
Start: 2019-08-16 | End: 2019-08-16 | Stop reason: HOSPADM

## 2019-08-16 RX ORDER — ACETAMINOPHEN 650 MG/1
650 SUPPOSITORY RECTAL EVERY 4 HOURS PRN
Status: DISCONTINUED | OUTPATIENT
Start: 2019-08-16 | End: 2019-08-16 | Stop reason: HOSPADM

## 2019-08-16 RX ADMIN — SODIUM CHLORIDE, PRESERVATIVE FREE 3 ML: 5 INJECTION INTRAVENOUS at 08:45

## 2019-08-16 RX ADMIN — ACETAMINOPHEN 650 MG: 325 TABLET, FILM COATED ORAL at 03:27

## 2019-08-16 RX ADMIN — SODIUM CHLORIDE 125 ML/HR: 9 INJECTION, SOLUTION INTRAVENOUS at 01:22

## 2019-08-16 RX ADMIN — LORAZEPAM 2 MG: 1 TABLET ORAL at 01:23

## 2019-08-16 RX ADMIN — SODIUM CHLORIDE, PRESERVATIVE FREE 3 ML: 5 INJECTION INTRAVENOUS at 01:22

## 2019-08-16 NOTE — DISCHARGE SUMMARY
NAME: Walter Fish ADMIT: 8/15/2019   : 1965  PCP: Provider, No Known    MRN: [id number] LOS: 0 days   AGE/SEX: 54 y.o. male  ROOM: P378/1     Date of Admission:  8/15/2019  Date of Discharge:  2019    PCP: Provider, No Known    CHIEF COMPLAINT  Dizziness      DISCHARGE DIAGNOSIS  Active Hospital Problems    Diagnosis  POA   • **Acute kidney injury (CMS/HCC) [N17.9]  Yes   • Leukocytosis [D72.829]  Yes   • Dizziness [R42]  Yes   • HTN (hypertension) [I10]  Yes   • Anxiety [F41.9]  Yes      Resolved Hospital Problems   No resolved problems to display.       SECONDARY DIAGNOSES  Past Medical History:   Diagnosis Date   • Acid reflux    • Anxiety    • Hypertension      HOSPITAL COURSE  Mr. Fish is a 54 y.o. male with a history of HTN, hypercholesterolemia, and anxiety that presents to [facility] complaining of an episode of dizziness and diaphoresis. He states the symptoms began earlier today while playing golf. He states initially his vision became blurry and then he became dizzy and started to sweat profusely. He states he went to the clubhouse to rest and the symptoms began to resolve after about 45 minutes. He denies loss of consciousness. Denies history of dizziness and vertigo. He states he recently lost 27 pounds and has had his blood pressure medication rearranged. He states he was initially taking norvasc, but due to hypotension, he was trailed on HCTZ. He states he could not tolerate the HCTZ  due to frequent urination, so he was placed back on norvasc 2.5 mg daily about a month ago. He denies chest pain during the episode, shortness of breath, nausea, and vomiting. He denies recent decreased appetite and PO intake. He states he is still intermittently dizzy, but denies any further complaints at this time. Lab work in ED revealed creat 2.25. BUN 20, GFR 30, and WBC 20.46. UA was positive for protein and chest x-ray was negative. EKG showed sinus tachycardia, but no other  acute changes. Orthostatics revealed a small drop in blood pressure and elevation in HR upon standing. He was given a bolus of LR in the ED.    Repeat labs had improvent in his PREETHI and his leukocytosis. No signs of infection. He feels well and is ready to dc to home. Will stop all of his BP meds at dc and he will follow with PCP in 1-2 weeks      DIAGNOSTICS    Collected  Updated  Procedure  Result Status      08/16/2019 0748  08/16/2019 0821  Basic Metabolic Panel [[id number]]    (Abnormal)   Blood from Arm, Right     Final result  Glucose 104 Abnormally high  mg/dL   BUN 21 Abnormally high  mg/dL   Creatinine 1.35 Abnormally high  mg/dL   Sodium 139 mmol/L   Potassium 3.7 mmol/L   Chloride 102 mmol/L   CO2 25.5 mmol/L   Calcium 8.8 mg/dL   eGFR Non  Am 55 Abnormally low  mL/min/1.73   BUN/Creatinine Ratio 15.6    Anion Gap 11.5 mmol/L          08/16/2019 0557  08/16/2019 0732  CBC (No Diff) [[id number]]   (Abnormal)   Blood     Final result  WBC 12.76 Abnormally high  10*3/mm3   RBC 5.12 10*6/mm3   Hemoglobin 14.7 g/dL   Hematocrit 45.4 %   MCV 88.7 fL   MCH 28.7 pg   MCHC 32.4 g/dL   RDW 12.3 %   RDW-SD 40.1 fl   MPV 9.2 fL   Platelets 327 10*3/mm3          08/15/2019 2136  08/15/2019 2204  Urinalysis With Microscopic If Indicated (No Culture) - Urine, Clean Catch [[id number]]   (Abnormal)   Urine, Clean Catch     Final result  Color, UA Yellow   Appearance, UA Clear   pH, UA 5.5   Specific Gravity, UA 1.010   Glucose Negative   Ketones, UA Negative   Bilirubin, UA Negative   Blood, UA Negative   Protein, UA 30 mg/dL (1+) Abnormal    Leukocytes, UA Negative   Nitrite, UA Negative   Urobilinogen, UA 0.2 E.U./dL          08/15/2019 2136  08/15/2019 2221  Urinalysis, Microscopic Only - Urine, Clean Catch [[id number]]   (Abnormal)   Urine, Clean Catch     Final result  RBC, UA None Seen /HPF   WBC, UA None Seen /HPF   Bacteria, UA None Seen /HPF   Squamous Epithelial Cells, UA None Seen /HPF   Hyaline Casts, UA  None Seen /LPF   Sperm, UA Small/1+ Abnormal  /HPF   Methodology: Manual Light Microscopy             PHYSICAL EXAM  Objective    Alert  nad  No resp distress  Soft, nt  RRR    CONDITION ON DISCHARGE  Stable.      DISCHARGE DISPOSITION   Home or Self Care      DISCHARGE MEDICATIONS       Your medication list      START taking these medications      Instructions Last Dose Given Next Dose Due   acetaminophen 325 MG tablet  Commonly known as:  TYLENOL      Take 2 tablets by mouth Every 4 (Four) Hours As Needed for Mild Pain .          CONTINUE taking these medications      Instructions Last Dose Given Next Dose Due   lansoprazole 30 MG capsule  Commonly known as:  PREVACID      Take 1 capsule by mouth Daily.       LORazepam 2 MG tablet  Commonly known as:  ATIVAN      Take 4 mg by mouth Every Night.          STOP taking these medications    amLODIPine 2.5 MG tablet  Commonly known as:  NORVASC        losartan 100 MG tablet  Commonly known as:  COZAAR              Where to Get Your Medications      Information about where to get these medications is not yet available    Ask your nurse or doctor about these medications  · acetaminophen 325 MG tablet        No future appointments.  Follow-up Information     Provider, No Known .    Contact information:  [facility] [address] [phone]                   TEST  RESULTS PENDING AT DISCHARGE         Compa Gee MD  [facility]  08/16/19  10:30 AM        It was a pleasure taking care of this patient while in the hospital.

## 2019-08-16 NOTE — PROGRESS NOTES
Clinical Pharmacy Services: Medication History    Walter Fish is a 54 y.o. male presenting to [facility] for   Chief Complaint   Patient presents with   • Dizziness       He  has a past medical history of Acid reflux, Anxiety, and Hypertension.    Allergies as of 08/15/2019 - Reviewed 08/15/2019   Allergen Reaction Noted   • Poison ivy extract Hives 05/03/2019       Medication information was obtained from: Patient  Pharmacy and Phone Number: [facility] [phone]    Prior to Admission Medications     Prescriptions Last Dose Informant Patient Reported? Taking?    amLODIPine (NORVASC) 2.5 MG tablet 8/15/2019 Self Yes Yes    Take 2.5 mg by mouth Daily.    lansoprazole (PREVACID) 30 MG capsule 8/15/2019 Self No Yes    Take 1 capsule by mouth Daily.    LORazepam (ATIVAN) 2 MG tablet 8/14/2019 Self Yes Yes    Take 4 mg by mouth Every Night.    losartan (COZAAR) 100 MG tablet 8/15/2019 Self Yes Yes    Take 100 mg by mouth Daily.            Medication notes: None    This medication list is complete to the best of my knowledge as of 8/15/2019    Please call if questions.    Devorah Bonilla, Medication History Technician  8/15/2019 9:31 PM

## 2019-08-16 NOTE — NURSING NOTE
Pt requesting set up for primary care follow up. appt made with Dr. grant 9/27/19 @ 0900 [id number] [place] [address] [phone]    Message left with emergency contact (spouse) to call back with appt info. Awaiting call back

## 2019-08-16 NOTE — ED NOTES
Nursing report ED to floor  Walter Fish  54 y.o.  male    HPI (triage note):   Chief Complaint   Patient presents with   • Dizziness       Admitting doctor:   Alcides Dsouza MD    Admitting diagnosis:   The primary encounter diagnosis was Acute kidney injury (CMS/HCC). A diagnosis of Leukocytosis, unspecified type was also pertinent to this visit.    Code status:   Current Code Status     This patient does not have a recorded code status. Please follow your organizational policy for patients in this situation.          Allergies:   Poison ivy extract    Weight:       08/15/19  1926   Weight: 94.6 kg (208 lb 9.6 oz)       Most recent vitals:   Vitals:    08/15/19 1933 08/15/19 1935 08/15/19 2030 08/15/19 2136   BP: 95/72 97/70 100/82 100/82   BP Location:   Right arm Right arm   Patient Position: Standing Standing Lying Sitting   Pulse: 118 (!) 121 89 77   Resp:   18 18   Temp:       TempSrc:       SpO2:   97% 100%   Weight:       Height:           Active LDAs/IV Access:   Lines, Drains & Airways    Active LDAs     Name:   Placement date:   Placement time:   Site:   Days:    Peripheral IV 08/15/19 1905 Left Antecubital   08/15/19    1905    Antecubital   less than 1                Labs (abnormal labs have a star):   Labs Reviewed   COMPREHENSIVE METABOLIC PANEL - Abnormal; Notable for the following components:       Result Value    Creatinine 2.25 (*)     CO2 21.6 (*)     eGFR Non African Amer 30 (*)     Anion Gap 18.4 (*)     All other components within normal limits    Narrative:     GFR Normal >60  Chronic Kidney Disease <60  Kidney Failure <15   CBC WITH AUTO DIFFERENTIAL - Abnormal; Notable for the following components:    WBC 20.46 (*)     RBC 5.82 (*)     Hematocrit 51.6 (*)     Neutrophil % 80.4 (*)     Lymphocyte % 10.3 (*)     Eosinophil % 0.1 (*)     Immature Grans % 0.9 (*)     Neutrophils, Absolute 16.42 (*)     Monocytes, Absolute 1.60 (*)     Immature Grans, Absolute 0.19 (*)     All other  components within normal limits   URINALYSIS W/ MICROSCOPIC IF INDICATED (NO CULTURE) - Abnormal; Notable for the following components:    Protein, UA 30 mg/dL (1+) (*)     All other components within normal limits   URINALYSIS, MICROSCOPIC ONLY - Abnormal; Notable for the following components:    Sperm, UA Small/1+ (*)     All other components within normal limits   TROPONIN (IN-HOUSE) - Normal    Narrative:     Troponin T Reference Range:  <= 0.03 ng/mL-   Negative for AMI  >0.03 ng/mL-     Abnormal for myocardial necrosis.  Clinicians would have to utilize clinical acumen, EKG, Troponin and serial changes to determine if it is an Acute Myocardial Infarction or myocardial injury due to an underlying chronic condition.    CK - Normal   RAINBOW DRAW    Narrative:     The following orders were created for panel order [place] Draw.  Procedure                               Abnormality         Status                     ---------                               -----------         ------                     Light Blue Top[[id number]]                                   Final result               Green Top (Gel)[[id number]]                                  Final result               Lavender Top[[id number]]                                     Final result               Gold Top - SST[[id number]]                                   Final result                 Please view results for these tests on the individual orders.   LIGHT BLUE TOP   GREEN TOP   LAVENDER TOP   GOLD TOP - SST   CBC AND DIFFERENTIAL    Narrative:     The following orders were created for panel order CBC & Differential.  Procedure                               Abnormality         Status                     ---------                               -----------         ------                     CBC Auto Differential[[id number]]        Abnormal            Final result                 Please view results for these tests on the individual orders.       EKG:   ECG 12  Lead   Final Result   HEART RATE= 100  bpm   RR Interval= 600  ms   RI Interval= 167  ms   P Horizontal Axis= 20  deg   P Front Axis= 22  deg   QRSD Interval= 93  ms   QT Interval= 342  ms   QRS Axis= -47  deg   T Wave Axis= 26  deg   - ABNORMAL ECG -   Sinus tachycardia   Inferior infarct, old   NO PRIOR TRACING AVAILABLE FOR COMPARISON   Electronically Signed By: Maicol Sanchez ([facility]) 15-Aug-2019 20:58:57   Date and Time of Study: 2019-08-15 19:10:27          Meds given in ED:   Medications   lactated ringers bolus 1,000 mL (0 mL Intravenous Stopped 8/15/19 2057)       Imaging results:  Xr Chest 1 View    Result Date: 8/15/2019  No evidence for acute pulmonary process. Follow-up as clinical indications persist.  This report was finalized on 8/15/2019 7:55 PM by Dr. Randell Matute M.D.        Ambulatory status:   Up with stand-by assist     Social issues:   Social History     Socioeconomic History   • Marital status:      Spouse name: Not on file   • Number of children: Not on file   • Years of education: Not on file   • Highest education level: Not on file   Tobacco Use   • Smoking status: Never Smoker   • Smokeless tobacco: Never Used   Substance and Sexual Activity   • Alcohol use: No   • Drug use: No   • Sexual activity: Ayah Alba RN  08/15/19 4622

## 2019-08-16 NOTE — PLAN OF CARE
Problem: Patient Care Overview  Goal: Plan of Care Review  Outcome: Ongoing (interventions implemented as appropriate)   08/16/19 7805   Coping/Psychosocial   Plan of Care Reviewed With patient   Plan of Care Review   Progress improving   OTHER   Outcome Summary VSS, Tylenol for pain, IVF. monitor labs. will continue to monitor       Problem: Pain, Acute (Adult)  Goal: Identify Related Risk Factors and Signs and Symptoms  Outcome: Ongoing (interventions implemented as appropriate)

## 2019-08-16 NOTE — H&P
Patient Name:  Walter Fish  YOB: 1965  MRN:  [id number]  Admit Date:  8/15/2019  Patient Care Team:  Provider, No Known as PCP - General      Subjective   History Present Illness     Chief Complaint   Patient presents with   • Dizziness       Mr. Fish is a 54 y.o. male with a history of HTN, hypercholesterolemia, and anxiety that presents to [facility] complaining of an episode of dizziness and diaphoresis. He states the symptoms began earlier today while playing golf. He states initially his vision became blurry and then he became dizzy and started to sweat profusely. [place] and the symptoms began to resolve after about 45 minutes. He denies loss of consciousness. Denies history of dizziness and vertigo. He states he recently lost 27 pounds and has had his blood pressure medication rearranged. He states he was initially taking norvasc, but due to hypotension, he was trailed on HCTZ. He states he could not tolerate the HCTZ  due to frequent urination, so he was placed back on norvasc 2.5 mg daily about a month ago. He denies chest pain during the episode, shortness of breath, nausea, and vomiting. He denies recent decreased appetite and PO intake. He states he is still intermittently dizzy, but denies any further complaints at this time. Lab work in ED revealed creat 2.25. BUN 20, GFR 30, and WBC 20.46. UA was positive for protein and chest x-ray was negative. EKG showed sinus tachycardia, but no other acute changes. Orthostatics revealed a small drop in blood pressure and elevation in HR upon standing. He was given a bolus of LR in the ED.      History of Present Illness  Review of Systems   Constitutional: Positive for diaphoresis. Negative for chills, fatigue and fever.   HENT: Negative.  Negative for congestion.    Eyes: Positive for visual disturbance. Negative for photophobia.   Respiratory: Negative.  Negative for cough, chest  tightness and shortness of breath.    Cardiovascular: Negative.  Negative for chest pain and leg swelling.   Gastrointestinal: Negative.  Negative for abdominal pain, constipation, diarrhea, nausea and vomiting.   Endocrine: Negative.  Negative for cold intolerance and heat intolerance.   Genitourinary: Negative.  Negative for decreased urine volume, dysuria, flank pain, frequency and urgency.   Musculoskeletal: Negative.  Negative for arthralgias, back pain and myalgias.   Skin: Negative.  Negative for color change, pallor and rash.   Allergic/Immunologic: Negative.  Negative for immunocompromised state.   Neurological: Positive for dizziness and light-headedness. Negative for weakness and headaches.   Hematological: Negative.  Negative for adenopathy. Does not bruise/bleed easily.   Psychiatric/Behavioral: Negative.  Negative for agitation, behavioral problems, confusion and decreased concentration.        Personal History     Past Medical History:   Diagnosis Date   • Acid reflux    • Anxiety    • Hypertension      Past Surgical History:   Procedure Laterality Date   • ANAL FISSURECTOMY     • ANKLE SURGERY Right    • CHOLECYSTECTOMY N/A 2/3/2017    Procedure: CHOLECYSTECTOMY LAPAROSCOPIC;  Surgeon: Dakota Mckeon MD;  [place] [facility] [place] [facility] [place]  Service:    • HAND SURGERY Right    • NASAL SINUS SURGERY       Family History   Problem Relation Age of Onset   • Heart disease Mother    • Hypertension Mother    • Thyroid disease Mother    • Depression Mother    • Heart disease Father    • Depression Father      Social History     Tobacco Use   • Smoking status: Never Smoker   • Smokeless tobacco: Never Used   Substance Use Topics   • Alcohol use: No   • Drug use: No     Medications Prior to Admission   Medication Sig Dispense Refill Last Dose   • amLODIPine (NORVASC) 2.5 MG tablet Take 2.5 mg by mouth Daily.   8/15/2019 at Unknown time   • lansoprazole (PREVACID) 30 MG capsule Take 1 capsule by mouth Daily. 90 capsule  3 8/15/2019 at Unknown time   • LORazepam (ATIVAN) 2 MG tablet Take 4 mg by mouth Every Night.   8/14/2019 at Unknown time   • losartan (COZAAR) 100 MG tablet Take 100 mg by mouth Daily.   8/15/2019 at Unknown time     Allergies:    Allergies   Allergen Reactions   • Poison Ivy Extract Hives       Objective    Objective     Vital Signs  Temp:  [96.7 °F (35.9 °C)-98.1 °F (36.7 °C)] 98.1 °F (36.7 °C)  Heart Rate:  [] 70  Resp:  [18-20] 18  BP: ()/(64-88) 137/88  SpO2:  [97 %-100 %] 97 %  on   ;   Device (Oxygen Therapy): room air  Body mass index is 29.33 kg/m².    Physical Exam   Constitutional: He is oriented to person, place, and time. He appears well-developed and well-nourished.   HENT:   Head: Normocephalic and atraumatic.   Eyes: Conjunctivae are normal. Pupils are equal, round, and reactive to light.   Neck: Normal range of motion. Neck supple.   Cardiovascular: Normal rate, regular rhythm, normal heart sounds and intact distal pulses. Exam reveals no gallop and no friction rub.   No murmur heard.  Pulmonary/Chest: Effort normal and breath sounds normal.   Abdominal: Soft. Bowel sounds are normal.   Musculoskeletal: Normal range of motion. He exhibits no edema.   Neurological: He is alert and oriented to person, place, and time.   Skin: Skin is warm and dry.   Psychiatric: He has a normal mood and affect. His behavior is normal. Judgment and thought content normal.       Results Review:  I reviewed the patient's new clinical results.  I reviewed the patient's new imaging results and agree with the interpretation.  I reviewed the patient's other test results and agree with the interpretation  I personally viewed and interpreted the patient's EKG/Telemetry data  Discussed with ED provider.    Lab Results (last 24 hours)     Procedure Component Value Units Date/Time    CBC & Differential [[id number]] Collected:  08/15/19 1906    Specimen:  Blood Updated:  08/15/19 1933    Narrative:       The following  orders were created for panel order CBC & Differential.  Procedure                               Abnormality         Status                     ---------                               -----------         ------                     CBC Auto Differential[[id number]]        Abnormal            Final result                 Please view results for these tests on the individual orders.    Comprehensive Metabolic Panel [[id number]]  (Abnormal) Collected:  08/15/19 1906    Specimen:  Blood Updated:  08/15/19 2016     Glucose 79 mg/dL      BUN 20 mg/dL      Creatinine 2.25 mg/dL      Sodium 142 mmol/L      Potassium 3.8 mmol/L      Chloride 102 mmol/L      CO2 21.6 mmol/L      Calcium 10.2 mg/dL      Total Protein 7.8 g/dL      Albumin 4.80 g/dL      ALT (SGPT) 22 U/L      AST (SGOT) 18 U/L      Alkaline Phosphatase 92 U/L      Total Bilirubin 1.0 mg/dL      eGFR Non African Amer 30 mL/min/1.73      Globulin 3.0 gm/dL      A/G Ratio 1.6 g/dL      BUN/Creatinine Ratio 8.9     Anion Gap 18.4 mmol/L     Narrative:       GFR Normal >60  Chronic Kidney Disease <60  Kidney Failure <15    Troponin [[id number]]  (Normal) Collected:  08/15/19 1906    Specimen:  Blood Updated:  08/15/19 2016     Troponin T <0.010 ng/mL     Narrative:       Troponin T Reference Range:  <= 0.03 ng/mL-   Negative for AMI  >0.03 ng/mL-     Abnormal for myocardial necrosis.  Clinicians would have to utilize clinical acumen, EKG, Troponin and serial changes to determine if it is an Acute Myocardial Infarction or myocardial injury due to an underlying chronic condition.     CBC Auto Differential [[id number]]  (Abnormal) Collected:  08/15/19 1906    Specimen:  Blood Updated:  08/15/19 1933     WBC 20.46 10*3/mm3      RBC 5.82 10*6/mm3      Hemoglobin 17.1 g/dL      Hematocrit 51.6 %      MCV 88.7 fL      MCH 29.4 pg      MCHC 33.1 g/dL      RDW 12.4 %      RDW-SD 40.6 fl      MPV 9.1 fL      Platelets 392 10*3/mm3      Neutrophil % 80.4 %      Lymphocyte % 10.3 %       Monocyte % 7.8 %      Eosinophil % 0.1 %      Basophil % 0.5 %      Immature Grans % 0.9 %      Neutrophils, Absolute 16.42 10*3/mm3      Lymphocytes, Absolute 2.11 10*3/mm3      Monocytes, Absolute 1.60 10*3/mm3      Eosinophils, Absolute 0.03 10*3/mm3      Basophils, Absolute 0.11 10*3/mm3      Immature Grans, Absolute 0.19 10*3/mm3      nRBC 0.0 /100 WBC     CK [[id number]]  (Normal) Collected:  08/15/19 1906    Specimen:  Blood Updated:  08/15/19 2212     Creatine Kinase 74 U/L     Urinalysis With Microscopic If Indicated (No Culture) - Urine, Clean Catch [[id number]]  (Abnormal) Collected:  08/15/19 2136    Specimen:  Urine, Clean Catch Updated:  08/15/19 2204     Color, UA Yellow     Appearance, UA Clear     pH, UA 5.5     Specific Gravity, UA 1.010     Glucose, UA Negative     Ketones, UA Negative     Bilirubin, UA Negative     Blood, UA Negative     Protein, UA 30 mg/dL (1+)     Leuk Esterase, UA Negative     Nitrite, UA Negative     Urobilinogen, UA 0.2 E.U./dL    Urinalysis, Microscopic Only - Urine, Clean Catch [[id number]]  (Abnormal) Collected:  08/15/19 2136    Specimen:  Urine, Clean Catch Updated:  08/15/19 2221     RBC, UA None Seen /HPF      WBC, UA None Seen /HPF      Bacteria, UA None Seen /HPF      Squamous Epithelial Cells, UA None Seen /HPF      Hyaline Casts, UA None Seen /LPF      Sperm, UA Small/1+ /HPF      Methodology Manual Light Microscopy          Imaging Results (last 24 hours)     Procedure Component Value Units Date/Time    XR Chest 1 View [[id number]] Collected:  08/15/19 1954     Updated:  08/15/19 1958    Narrative:       XR CHEST 1 VW-     HISTORY: Male who is 54 years-old,  near syncope     TECHNIQUE: Frontal view of the chest     COMPARISON: None available     FINDINGS: Heart, mediastinum and pulmonary vasculature are unremarkable.  No focal pulmonary consolidation, pleural effusion, or pneumothorax. No  acute osseous process.       Impression:       No evidence for acute  pulmonary process. Follow-up as  clinical indications persist.     This report was finalized on 8/15/2019 7:55 PM by Dr. Randell Matute M.D.                  ECG 12 Lead   Final Result   HEART RATE= 100  bpm   RR Interval= 600  ms   AR Interval= 167  ms   P Horizontal Axis= 20  deg   P Front Axis= 22  deg   QRSD Interval= 93  ms   QT Interval= 342  ms   QRS Axis= -47  deg   T Wave Axis= 26  deg   - ABNORMAL ECG -   Sinus tachycardia   Inferior infarct, old   NO PRIOR TRACING AVAILABLE FOR COMPARISON   Electronically Signed By: Maicol Sanchez ([facility]) 15-Aug-2019 20:58:57   Date and Time of Study: 2019-08-15 19:10:27           Assessment/Plan     Active Hospital Problems    Diagnosis POA   • **Acute kidney injury (CMS/HCC) [N17.9] Yes   • Leukocytosis [D72.829] Unknown   • Dizziness [R42] Unknown   • HTN (hypertension) [I10] Unknown   • Anxiety [F41.9] Yes     Acute kidney injury/dizziness/orthostasis  -Creat 2.25, GFR 30  -UA positive for protein  -Most likely due to dehydration, however he does take losartan for HTN  -IVF, recheck labs in AM  -Orthostatics in ED revealed drop in BP from lying to standin/64 to 95/72, and elevation in HR 90 to 118. Recheck orthostatics in AM    Leukocytosis  -Cause unknown. Chest x-ray neg. UA neg for bacteria, leukocytes, and nitrites  -Recheck CBC in AM    HTN/Anxiety  -Will hold norvasc and losartan due to PREETHI/orthostasis. Continue other home medications    -I discussed the patients findings and my recommendations with patient.    VTE Prophylaxis - SCDs.  Code Status - Full code.       JIM Perez  [facility]  19  12:23 AM    I supervised care provided by the JIM. We have discussed the case. I have reviewed  the note and agree with the plan of treatment. I personally interviewed the patient and examined the patient. My exam shows a pleasant 55 yo. Lungs clear. Heart regular. Plan as above with IVFs, monitoring renal fx,  holding BP meds. No signs of infection.     Compa Gee MD  [facility]  08/16/19  10:07 AM

## 2019-08-19 RX ORDER — LOSARTAN POTASSIUM 100 MG/1
TABLET ORAL
Qty: 90 TABLET | Refills: 3 | Status: SHIPPED | OUTPATIENT
Start: 2019-08-19 | End: 2019-10-10

## 2019-09-05 RX ORDER — LORAZEPAM 2 MG/1
TABLET ORAL
Qty: 90 TABLET | Refills: 1 | Status: CANCELLED | OUTPATIENT
Start: 2019-09-05

## 2019-09-09 RX ORDER — LORAZEPAM 2 MG/1
4 TABLET ORAL NIGHTLY
Qty: 60 TABLET | Refills: 0 | Status: SHIPPED | OUTPATIENT
Start: 2019-09-09 | End: 2019-10-10 | Stop reason: SDUPTHER

## 2019-09-09 RX ORDER — LORAZEPAM 2 MG/1
TABLET ORAL
Qty: 90 TABLET | Refills: 1 | OUTPATIENT
Start: 2019-09-09

## 2019-09-09 NOTE — TELEPHONE ENCOUNTER
Pt has been called lm advising pt prescription is ready for  at our office, he has an upcoming appt with another physician and will not get anymore refills from us.

## 2019-10-09 LAB
ALBUMIN SERPL-MCNC: 4.3 G/DL (ref 3.5–5.2)
ALBUMIN/GLOB SERPL: 2 G/DL
ALP SERPL-CCNC: 73 U/L (ref 39–117)
ALT SERPL W P-5'-P-CCNC: 20 U/L (ref 1–41)
ANION GAP SERPL CALCULATED.3IONS-SCNC: 11.2 MMOL/L (ref 5–15)
AST SERPL-CCNC: 12 U/L (ref 1–40)
BASOPHILS # BLD AUTO: 0.08 10*3/MM3 (ref 0–0.2)
BASOPHILS NFR BLD AUTO: 0.9 % (ref 0–1.5)
BILIRUB SERPL-MCNC: 0.3 MG/DL (ref 0.2–1.2)
BILIRUB UR QL STRIP: NEGATIVE
BUN BLD-MCNC: 14 MG/DL (ref 6–20)
BUN/CREAT SERPL: 13.3 (ref 7–25)
CALCIUM SPEC-SCNC: 8.7 MG/DL (ref 8.6–10.5)
CHLORIDE SERPL-SCNC: 106 MMOL/L (ref 98–107)
CLARITY UR: CLEAR
CO2 SERPL-SCNC: 25.8 MMOL/L (ref 22–29)
COLOR UR: YELLOW
CREAT BLD-MCNC: 1.05 MG/DL (ref 0.76–1.27)
DEPRECATED RDW RBC AUTO: 40.5 FL (ref 37–54)
EOSINOPHIL # BLD AUTO: 0.11 10*3/MM3 (ref 0–0.4)
EOSINOPHIL NFR BLD AUTO: 1.3 % (ref 0.3–6.2)
ERYTHROCYTE [DISTWIDTH] IN BLOOD BY AUTOMATED COUNT: 12.8 % (ref 12.3–15.4)
GFR SERPL CREATININE-BSD FRML MDRD: 73 ML/MIN/1.73
GLOBULIN UR ELPH-MCNC: 2.2 GM/DL
GLUCOSE BLD-MCNC: 92 MG/DL (ref 65–99)
GLUCOSE UR STRIP-MCNC: NEGATIVE MG/DL
HCT VFR BLD AUTO: 42.8 % (ref 37.5–51)
HGB BLD-MCNC: 14.4 G/DL (ref 13–17.7)
HGB UR QL STRIP.AUTO: NEGATIVE
IMM GRANULOCYTES # BLD AUTO: 0.03 10*3/MM3 (ref 0–0.05)
IMM GRANULOCYTES NFR BLD AUTO: 0.3 % (ref 0–0.5)
KETONES UR QL STRIP: NEGATIVE
LEUKOCYTE ESTERASE UR QL STRIP.AUTO: NEGATIVE
LIPASE SERPL-CCNC: 37 U/L (ref 13–60)
LYMPHOCYTES # BLD AUTO: 3.58 10*3/MM3 (ref 0.7–3.1)
LYMPHOCYTES NFR BLD AUTO: 41.7 % (ref 19.6–45.3)
MCH RBC QN AUTO: 29.5 PG (ref 26.6–33)
MCHC RBC AUTO-ENTMCNC: 33.6 G/DL (ref 31.5–35.7)
MCV RBC AUTO: 87.7 FL (ref 79–97)
MONOCYTES # BLD AUTO: 0.68 10*3/MM3 (ref 0.1–0.9)
MONOCYTES NFR BLD AUTO: 7.9 % (ref 5–12)
NEUTROPHILS # BLD AUTO: 4.1 10*3/MM3 (ref 1.7–7)
NEUTROPHILS NFR BLD AUTO: 47.9 % (ref 42.7–76)
NITRITE UR QL STRIP: NEGATIVE
NRBC BLD AUTO-RTO: 0 /100 WBC (ref 0–0.2)
PH UR STRIP.AUTO: 5.5 [PH] (ref 5–8)
PLATELET # BLD AUTO: 346 10*3/MM3 (ref 140–450)
PMV BLD AUTO: 8.9 FL (ref 6–12)
POTASSIUM BLD-SCNC: 3.9 MMOL/L (ref 3.5–5.2)
PROT SERPL-MCNC: 6.5 G/DL (ref 6–8.5)
PROT UR QL STRIP: NEGATIVE
RBC # BLD AUTO: 4.88 10*6/MM3 (ref 4.14–5.8)
SODIUM BLD-SCNC: 143 MMOL/L (ref 136–145)
SP GR UR STRIP: >=1.03 (ref 1–1.03)
UROBILINOGEN UR QL STRIP: NORMAL
WBC NRBC COR # BLD: 8.58 10*3/MM3 (ref 3.4–10.8)

## 2019-10-09 PROCEDURE — 81003 URINALYSIS AUTO W/O SCOPE: CPT

## 2019-10-09 PROCEDURE — 36415 COLL VENOUS BLD VENIPUNCTURE: CPT

## 2019-10-09 PROCEDURE — 85025 COMPLETE CBC W/AUTO DIFF WBC: CPT

## 2019-10-09 PROCEDURE — 83690 ASSAY OF LIPASE: CPT

## 2019-10-09 PROCEDURE — 80053 COMPREHEN METABOLIC PANEL: CPT

## 2019-10-09 PROCEDURE — 99283 EMERGENCY DEPT VISIT LOW MDM: CPT

## 2019-10-09 RX ORDER — SODIUM CHLORIDE 0.9 % (FLUSH) 0.9 %
10 SYRINGE (ML) INJECTION AS NEEDED
Status: DISCONTINUED | OUTPATIENT
Start: 2019-10-09 | End: 2019-10-10 | Stop reason: HOSPADM

## 2019-10-10 ENCOUNTER — APPOINTMENT (OUTPATIENT)
Dept: CT IMAGING | Facility: HOSPITAL | Age: 54
End: 2019-10-10

## 2019-10-10 ENCOUNTER — OFFICE VISIT (OUTPATIENT)
Dept: FAMILY MEDICINE CLINIC | Facility: CLINIC | Age: 54
End: 2019-10-10

## 2019-10-10 ENCOUNTER — HOSPITAL ENCOUNTER (EMERGENCY)
Facility: HOSPITAL | Age: 54
Discharge: HOME | End: 2019-10-10
Attending: EMERGENCY MEDICINE | Admitting: EMERGENCY MEDICINE

## 2019-10-10 VITALS
OXYGEN SATURATION: 98 % | DIASTOLIC BLOOD PRESSURE: 80 MMHG | WEIGHT: 202 LBS | SYSTOLIC BLOOD PRESSURE: 120 MMHG | HEART RATE: 87 BPM | BODY MASS INDEX: 28.92 KG/M2 | TEMPERATURE: 97.1 F | HEIGHT: 70 IN | RESPIRATION RATE: 15 BRPM

## 2019-10-10 VITALS
BODY MASS INDEX: 28.51 KG/M2 | RESPIRATION RATE: 14 BRPM | DIASTOLIC BLOOD PRESSURE: 99 MMHG | OXYGEN SATURATION: 98 % | HEIGHT: 71 IN | SYSTOLIC BLOOD PRESSURE: 139 MMHG | TEMPERATURE: 96 F | HEART RATE: 74 BPM

## 2019-10-10 DIAGNOSIS — I10 ESSENTIAL HYPERTENSION: Primary | ICD-10-CM

## 2019-10-10 DIAGNOSIS — R10.31 RIGHT LOWER QUADRANT ABDOMINAL PAIN: Primary | ICD-10-CM

## 2019-10-10 DIAGNOSIS — G47.00 INSOMNIA, PERSISTENT: ICD-10-CM

## 2019-10-10 DIAGNOSIS — R97.20 ELEVATED PSA: ICD-10-CM

## 2019-10-10 DIAGNOSIS — Z23 NEED FOR INFLUENZA VACCINATION: ICD-10-CM

## 2019-10-10 LAB
HOLD SPECIMEN: NORMAL
HOLD SPECIMEN: NORMAL
WHOLE BLOOD HOLD SPECIMEN: NORMAL
WHOLE BLOOD HOLD SPECIMEN: NORMAL

## 2019-10-10 PROCEDURE — 90674 CCIIV4 VAC NO PRSV 0.5 ML IM: CPT | Performed by: INTERNAL MEDICINE

## 2019-10-10 PROCEDURE — 90471 IMMUNIZATION ADMIN: CPT | Performed by: INTERNAL MEDICINE

## 2019-10-10 PROCEDURE — 25010000002 IOPAMIDOL 61 % SOLUTION: Performed by: EMERGENCY MEDICINE

## 2019-10-10 PROCEDURE — 99214 OFFICE O/P EST MOD 30 MIN: CPT | Performed by: INTERNAL MEDICINE

## 2019-10-10 PROCEDURE — 74177 CT ABD & PELVIS W/CONTRAST: CPT

## 2019-10-10 RX ORDER — LORAZEPAM 2 MG/1
4 TABLET ORAL NIGHTLY
Qty: 60 TABLET | Refills: 0 | Status: SHIPPED | OUTPATIENT
Start: 2019-10-10 | End: 2020-04-02 | Stop reason: HOSPADM

## 2019-10-10 RX ORDER — NEOMYCIN SULFATE, POLYMYXIN B SULFATE AND HYDROCORTISONE 10; 3.5; 1 MG/ML; MG/ML; [USP'U]/ML
SUSPENSION/ DROPS AURICULAR (OTIC)
Refills: 0 | COMMUNITY
Start: 2019-09-05 | End: 2020-03-25

## 2019-10-10 RX ORDER — LOSARTAN POTASSIUM AND HYDROCHLOROTHIAZIDE 12.5; 1 MG/1; MG/1
TABLET ORAL
COMMUNITY
Start: 2019-10-10 | End: 2020-03-25

## 2019-10-10 RX ORDER — AMLODIPINE BESYLATE 2.5 MG/1
2.5 TABLET ORAL DAILY
Refills: 3 | COMMUNITY
Start: 2019-09-08 | End: 2020-03-25

## 2019-10-10 RX ADMIN — IOPAMIDOL 85 ML: 612 INJECTION, SOLUTION INTRAVENOUS at 01:49

## 2019-10-10 NOTE — ED PROVIDER NOTES
" EMERGENCY DEPARTMENT ENCOUNTER    CHIEF COMPLAINT  Chief Complaint: abdominal pain  History given by: patient  History limited by: none  Room Number: 29/29  PMD: Provider, No Known      HPI:  Pt is a 54 y.o. male who presents complaining of \"stabbing\" RLQ abd pain onset 2 hours PTA. The pain has resolved since arrival to the ED. No aggravating or alleviating factors. Non-radiating. Denies nausea, vomiting, diarrhea, testicular pain, penile pain, and urinary Sx. Hx of cholecystectomy. There are no other complaints.      PAST MEDICAL HISTORY  Active Ambulatory Problems     Diagnosis Date Noted   • Anxiety 02/26/2016   • Insomnia, persistent 02/26/2016   • Acid reflux 02/26/2016   • Elevated cholesterol 02/26/2016   • HTN (hypertension) 02/26/2016   • Status post cholecystectomy 02/24/2017   • Acute kidney injury (CMS/HCC) 08/15/2019   • Leukocytosis 08/16/2019   • Dizziness 08/16/2019     Resolved Ambulatory Problems     Diagnosis Date Noted   • Abdominal pain, acute, right upper quadrant 01/31/2017     Past Medical History:   Diagnosis Date   • Acid reflux    • Anxiety    • Hypertension        PAST SURGICAL HISTORY  Past Surgical History:   Procedure Laterality Date   • ANAL FISSURECTOMY     • ANKLE SURGERY Right    • CHOLECYSTECTOMY N/A 2/3/2017    Procedure: CHOLECYSTECTOMY LAPAROSCOPIC;  Surgeon: Dakota Mckeon MD;  [place] [facility] [place] [facility] [place]  Service:    • HAND SURGERY Right    • NASAL SINUS SURGERY         FAMILY HISTORY  Family History   Problem Relation Age of Onset   • Heart disease Mother    • Hypertension Mother    • Thyroid disease Mother    • Depression Mother    • Heart disease Father    • Depression Father        SOCIAL HISTORY  Social History     Socioeconomic History   • Marital status:      Spouse name: Not on file   • Number of children: Not on file   • Years of education: Not on file   • Highest education level: Not on file   Tobacco Use   • Smoking status: Never Smoker   • Smokeless " tobacco: Never Used   Substance and Sexual Activity   • Alcohol use: No   • Drug use: No   • Sexual activity: Defer       ALLERGIES  Poison ivy extract    REVIEW OF SYSTEMS  Review of Systems   Constitutional: Negative for activity change, appetite change and fever.   HENT: Negative for congestion and sore throat.    Eyes: Negative.    Respiratory: Negative for cough and shortness of breath.    Cardiovascular: Negative for chest pain and leg swelling.   Gastrointestinal: Positive for abdominal pain (RLQ). Negative for diarrhea and vomiting.   Endocrine: Negative.    Genitourinary: Negative for decreased urine volume and dysuria.   Musculoskeletal: Negative for neck pain.   Skin: Negative for rash and wound.   Allergic/Immunologic: Negative.    Neurological: Negative for weakness, numbness and headaches.   Hematological: Negative.    Psychiatric/Behavioral: Negative.    All other systems reviewed and are negative.      PHYSICAL EXAM  ED Triage Vitals [10/09/19 2245]   Temp Heart Rate Resp BP SpO2   96 °F (35.6 °C) 78 16 -- 98 %      Temp src Heart Rate Source Patient Position BP Location FiO2 (%)   -- -- -- -- --       Physical Exam   Constitutional: He is oriented to person, place, and time. No distress.   HENT:   Head: Normocephalic and atraumatic.   Eyes: EOM are normal. Pupils are equal, round, and reactive to light.   Neck: Normal range of motion. Neck supple.   Cardiovascular: Normal rate, regular rhythm, normal heart sounds and intact distal pulses.   Pulmonary/Chest: Effort normal and breath sounds normal. No respiratory distress.   Abdominal: Soft. There is tenderness in the right lower quadrant. There is no rebound and no guarding.   Genitourinary:   Genitourinary Comments: Normal scrotum and penis.  No scrotal swelling or tenderness.  No lymphadenopathy   Musculoskeletal: Normal range of motion. He exhibits no edema.   Neurological: He is alert and oriented to person, place, and time. He has normal  sensation and normal strength.   Skin: Skin is warm and dry.   Psychiatric: Mood and affect normal.   Nursing note and vitals reviewed.      LAB RESULTS  Lab Results (last 24 hours)     Procedure Component Value Units Date/Time    Urinalysis With Microscopic If Indicated (No Culture) - Urine, Clean Catch [[id number]]  (Normal) Collected:  10/09/19 2311    Specimen:  Urine, Clean Catch Updated:  10/09/19 2339     Color, UA Yellow     Appearance, UA Clear     pH, UA 5.5     Specific Gravity, UA >=1.030     Glucose, UA Negative     Ketones, UA Negative     Bilirubin, UA Negative     Blood, UA Negative     Protein, UA Negative     Leuk Esterase, UA Negative     Nitrite, UA Negative     Urobilinogen, UA 0.2 E.U./dL    Narrative:       Urine microscopic not indicated.    CBC & Differential [[id number]] Collected:  10/09/19 2316    Specimen:  Blood Updated:  10/09/19 2342    Narrative:       The following orders were created for panel order CBC & Differential.  Procedure                               Abnormality         Status                     ---------                               -----------         ------                     CBC Auto Differential[[id number]]        Abnormal            Final result                 Please view results for these tests on the individual orders.    Comprehensive Metabolic Panel [[id number]] Collected:  10/09/19 2316    Specimen:  Blood from Arm, Left Updated:  10/09/19 2357     Glucose 92 mg/dL      BUN 14 mg/dL      Creatinine 1.05 mg/dL      Sodium 143 mmol/L      Potassium 3.9 mmol/L      Chloride 106 mmol/L      CO2 25.8 mmol/L      Calcium 8.7 mg/dL      Total Protein 6.5 g/dL      Albumin 4.30 g/dL      ALT (SGPT) 20 U/L      AST (SGOT) 12 U/L      Alkaline Phosphatase 73 U/L      Total Bilirubin 0.3 mg/dL      eGFR Non African Amer 73 mL/min/1.73      Globulin 2.2 gm/dL      A/G Ratio 2.0 g/dL      BUN/Creatinine Ratio 13.3     Anion Gap 11.2 mmol/L     Narrative:       GFR Normal  >60  Chronic Kidney Disease <60  Kidney Failure <15    Lipase [[id number]]  (Normal) Collected:  10/09/19 2316    Specimen:  Blood from Arm, Left Updated:  10/09/19 2357     Lipase 37 U/L     CBC Auto Differential [[id number]]  (Abnormal) Collected:  10/09/19 2316    Specimen:  Blood from Arm, Left Updated:  10/09/19 2342     WBC 8.58 10*3/mm3      RBC 4.88 10*6/mm3      Hemoglobin 14.4 g/dL      Hematocrit 42.8 %      MCV 87.7 fL      MCH 29.5 pg      MCHC 33.6 g/dL      RDW 12.8 %      RDW-SD 40.5 fl      MPV 8.9 fL      Platelets 346 10*3/mm3      Neutrophil % 47.9 %      Lymphocyte % 41.7 %      Monocyte % 7.9 %      Eosinophil % 1.3 %      Basophil % 0.9 %      Immature Grans % 0.3 %      Neutrophils, Absolute 4.10 10*3/mm3      Lymphocytes, Absolute 3.58 10*3/mm3      Monocytes, Absolute 0.68 10*3/mm3      Eosinophils, Absolute 0.11 10*3/mm3      Basophils, Absolute 0.08 10*3/mm3      Immature Grans, Absolute 0.03 10*3/mm3      nRBC 0.0 /100 WBC           I ordered the above labs and reviewed the results    RADIOLOGY  CT Abdomen Pelvis With Contrast   Final Result   No acute findings.       Radiation dose reduction techniques were utilized, including automated   exposure control and exposure modulation based on body size.       This report was finalized on 10/10/2019 2:01 AM by Dr. Mary Berry M.D.               I ordered the above noted radiological studies. Interpreted by radiologist. Reviewed by me in PACS.       PROCEDURES  Procedures        PROGRESS AND CONSULTS  ED Course as of Oct 10 0246   Thu Oct 10, 2019   0213 2:13 AM  I informed the patient the results of the test.  I am not certain of the exact cause of the patient's symptoms.  The patient's repeat exam, test results, and history are not concerning for a serious etiology of their symptoms. I explained  to the patient that it is common, despite all our testing, that we cannot find the cause of someone symptoms in the emergency department.   Patient may present early in the disease process or it  is not readily apparent the cause of the patient's symptoms.  I instructed  the patient to return to the emergency department if fever develops, worsening of pain, or worsening of symptoms.  I instructed the patient to follow up with their physician for further evaluation in 2-3 days.  The patient understands and agrees with the treatment plan.    [MM]      ED Course User Index  [MM] Germain Cifuentes MD       0213- BP- 134/94 HR- 78 Temp- 96 °F (35.6 °C) O2 sat- 98%  Rechecked pt. Pt is resting comfortably. His pain is still improved. Notified pt of his negative labs and unremarkable CT abd results. Discussed the plan to discharge the pt home. I instructed the pt to f/u with his PCP. Advised pt to use OTC medications as needed for his pain. RTED instructions given. Pt understands and agrees with the plan, all questions answered.     MEDICAL DECISION MAKING  Results were reviewed/discussed with the patient and they were also made aware of online access. Pt also made aware that some labs, such as cultures, will not be resulted during ER visit and follow up with PMD is necessary.     MDM  Number of Diagnoses or Management Options     Amount and/or Complexity of Data Reviewed  Clinical lab tests: ordered and reviewed (Unremarkable)  Tests in the radiology section of CPT®: ordered and reviewed (Ct abd- negative)  Decide to obtain previous medical records or to obtain history from someone other than the patient: yes  Independent visualization of images, tracings, or specimens: yes    Patient Progress  Patient progress: stable         DIAGNOSIS  Final diagnoses:   Right lower quadrant abdominal pain       DISPOSITION  DISCHARGE    Patient discharged in stable condition.    Reviewed implications of results, diagnosis, meds, responsibility to follow up, warning signs and symptoms of possible worsening, potential complications and reasons to return to  ER.    Patient/Family voiced understanding of above instructions.    Discussed plan for discharge, as there is no emergent indication for admission. Patient referred to primary care provider for BP management due to today's BP. Pt/family is agreeable and understands need for follow up and repeat testing.  Pt is aware that discharge does not mean that nothing is wrong but it indicates no emergency is present that requires admission and they must continue care with follow-up as given below or physician of their choice.     FOLLOW-UP  PATIENT LIAISON [place] [id number] [phone]    Call tomorrow morning to arrange follow-up in the next 1 to 2 weeks.  Return to the emergency department if the pain gets worse, vomiting, fever, or any concerns.         Medication List      No changes were made to your prescriptions during this visit.         Latest Documented Vital Signs:  As of 2:46 AM  BP- 139/99 HR- 74 Temp- 96 °F (35.6 °C) O2 sat- 98%    --  Documentation assistance provided by claudette Valenzuela for Dr. Esau MD.  Information recorded by the scribe was done at my direction and has been verified and validated by me.         Timothy Valenzuela  10/10/19 0215       Germain Cifuentes MD  10/10/19 [id number]

## 2019-10-10 NOTE — PROGRESS NOTES
Subjective   Walter Fish is a 54 y.o. male. Patient is here today for   Chief Complaint   Patient presents with   • Med Refill          Vitals:    10/10/19 1351   BP: 120/80   Pulse: 87   Resp: 15   Temp: 97.1 °F (36.2 °C)   SpO2: 98%     The following portions of the patient's history were reviewed and updated as appropriate: allergies, current medications, past family history, past medical history, past social history, past surgical history and problem list.    Past Medical History:   Diagnosis Date   • Acid reflux    • Anxiety    • Hypertension       Allergies   Allergen Reactions   • Poison Ivy Extract Hives      Social History     Socioeconomic History   • Marital status:      Spouse name: Not on file   • Number of children: Not on file   • Years of education: Not on file   • Highest education level: Not on file   Tobacco Use   • Smoking status: Never Smoker   • Smokeless tobacco: Never Used   Substance and Sexual Activity   • Alcohol use: No   • Drug use: No   • Sexual activity: Defer        Current Outpatient Medications:   •  amLODIPine (NORVASC) 2.5 MG tablet, Take 2.5 mg by mouth Daily., Disp: , Rfl: 3  •  lansoprazole (PREVACID) 30 MG capsule, Take 1 capsule by mouth Daily., Disp: 90 capsule, Rfl: 3  •  LORazepam (ATIVAN) 2 MG tablet, Take 2 tablets by mouth Every Night., Disp: 60 tablet, Rfl: 0  •  losartan-hydrochlorothiazide (HYZAAR) 100-12.5 MG per tablet, , Disp: , Rfl:   •  neomycin-polymyxin-hydrocortisone (CORTISPORIN) 3.5-46085-1 otic suspension, APPLY 1 DROP TO NAILS TWICE A DAY, Disp: , Rfl: 0  No current facility-administered medications for this visit.      Objective     History of Present Illness Walter is here for 6-month med check as well as a blood pressure lab follow-up.  He has hypertension, gastroesophageal reflux, and chronic insomnia.  He takes 4 mg of Ativan at bedtime and has done so for years.  He was seen in July and it was noted that he had not had lab work in a  couple years.  He monitors his blood pressure reports stable readings.  He is physically active at work but does not formally exercise.  He states he went to emergency room yesterday with abdominal pain.  Evaluation was unremarkable.  He denies any pain at present.    Review of Systems   Constitutional: Negative for fever.   Respiratory: Negative.    Cardiovascular:        /80   Gastrointestinal:        As in HPI   Genitourinary:        Some urinary hesitancy   Psychiatric/Behavioral: Positive for sleep disturbance.       Physical Exam   Constitutional: He appears well-developed and well-nourished.   Cardiovascular: Normal rate, regular rhythm and normal heart sounds.   112/80   Pulmonary/Chest: Effort normal and breath sounds normal.   Psychiatric: He has a normal mood and affect. His behavior is normal. Judgment and thought content normal.   Vitals reviewed.      ASSESSMENT     Problem List Items Addressed This Visit        Cardiovascular and Mediastinum    HTN (hypertension) - Primary    Relevant Medications    amLODIPine (NORVASC) 2.5 MG tablet    losartan-hydrochlorothiazide (HYZAAR) 100-12.5 MG per tablet       Other    Insomnia, persistent    Elevated PSA    Relevant Orders    Ambulatory Referral to Urology          PLAN  Patient Instructions   Blood pressure is stable.  HDL cholesterol is normal and LDL cholesterol is mildly elevated.  Fasting blood sugar, kidney functions, liver functions are normal.  Complete blood count is normal.  Prostate-specific antigen is elevated.  It is noted that PSA was elevated in 2015 when it was checked by your previous physician.  Will refer to urology.  Continue exercise and current medicines.  We will give a flu vaccination today.    Return in about 6 months (around 4/10/2020) for Recheck.

## 2019-10-10 NOTE — PATIENT INSTRUCTIONS
Blood pressure is stable.  HDL cholesterol is normal and LDL cholesterol is mildly elevated.  Fasting blood sugar, kidney functions, liver functions are normal.  Complete blood count is normal.  Prostate-specific antigen is elevated.  It is noted that PSA was elevated in 2015 when it was checked by your previous physician.  Will refer to urology.  Continue exercise and current medicines.  We will give a flu vaccination today.

## 2019-10-10 NOTE — DISCHARGE INSTRUCTIONS
Try Tylenol or Motrin for pain.  As we have discussed, return if the pain gets worse, vomiting, fever, or any concerns.

## 2019-10-10 NOTE — ED NOTES
"Pt reports abdominal pain that started in the middle/left of his abdomen and is now in his RUQ. Pain has been there for \"a few days\". Pt denies n/v/d, constipation. Last BM yesterday and pt reports no abnormalities. Pain is not worse with palpation and pt rates pain 9/10.     Rosa Skaggs, TONG  10/10/19 0202    "

## 2019-10-10 NOTE — ED NOTES
Pt report RLQ abd pain that started this morning, pt denies nausea, vomiting, or pain with voiding.      Jaime Love, RN  10/09/19 5891

## 2020-02-06 RX ORDER — LANSOPRAZOLE 30 MG/1
CAPSULE, DELAYED RELEASE ORAL
Qty: 90 CAPSULE | Refills: 0 | Status: SHIPPED | OUTPATIENT
Start: 2020-02-06

## 2020-03-25 ENCOUNTER — HOSPITAL ENCOUNTER (INPATIENT)
Facility: HOSPITAL | Age: 55
LOS: 8 days | Discharge: HOME | End: 2020-04-02
Attending: SPECIALIST | Admitting: SPECIALIST

## 2020-03-25 ENCOUNTER — HOSPITAL ENCOUNTER (EMERGENCY)
Facility: HOSPITAL | Age: 55
Discharge: PSYCHIATRIC OR SUBSTANCE USE FACILITY | End: 2020-03-25
Attending: EMERGENCY MEDICINE | Admitting: EMERGENCY MEDICINE

## 2020-03-25 VITALS
TEMPERATURE: 98.2 F | RESPIRATION RATE: 16 BRPM | BODY MASS INDEX: 26.2 KG/M2 | SYSTOLIC BLOOD PRESSURE: 150 MMHG | OXYGEN SATURATION: 95 % | HEART RATE: 102 BPM | WEIGHT: 183 LBS | DIASTOLIC BLOOD PRESSURE: 107 MMHG | HEIGHT: 70 IN

## 2020-03-25 DIAGNOSIS — F32.A DEPRESSION WITH SUICIDAL IDEATION: Primary | ICD-10-CM

## 2020-03-25 DIAGNOSIS — G47.00 INSOMNIA, PERSISTENT: ICD-10-CM

## 2020-03-25 DIAGNOSIS — F41.9 ANXIETY: Primary | ICD-10-CM

## 2020-03-25 DIAGNOSIS — R45.851 DEPRESSION WITH SUICIDAL IDEATION: Primary | ICD-10-CM

## 2020-03-25 PROBLEM — F32.2 SEVERE MAJOR DEPRESSION, SINGLE EPISODE, WITHOUT PSYCHOTIC FEATURES (HCC): Status: ACTIVE | Noted: 2020-03-25

## 2020-03-25 LAB
AMPHET+METHAMPHET UR QL: NEGATIVE
BARBITURATES UR QL SCN: NEGATIVE
BENZODIAZ UR QL SCN: NEGATIVE
CANNABINOIDS SERPL QL: NEGATIVE
COCAINE UR QL: NEGATIVE
ETHANOL BLD-MCNC: <10 MG/DL (ref 0–10)
ETHANOL UR QL: <0.01 %
METHADONE UR QL SCN: NEGATIVE
OPIATES UR QL: NEGATIVE
OXYCODONE UR QL SCN: NEGATIVE

## 2020-03-25 PROCEDURE — 84443 ASSAY THYROID STIM HORMONE: CPT | Performed by: SPECIALIST

## 2020-03-25 PROCEDURE — 80307 DRUG TEST PRSMV CHEM ANLYZR: CPT | Performed by: NURSE PRACTITIONER

## 2020-03-25 PROCEDURE — 84439 ASSAY OF FREE THYROXINE: CPT | Performed by: SPECIALIST

## 2020-03-25 PROCEDURE — 99284 EMERGENCY DEPT VISIT MOD MDM: CPT

## 2020-03-25 PROCEDURE — 80053 COMPREHEN METABOLIC PANEL: CPT | Performed by: SPECIALIST

## 2020-03-25 PROCEDURE — 90791 PSYCH DIAGNOSTIC EVALUATION: CPT

## 2020-03-25 RX ORDER — LOSARTAN POTASSIUM 100 MG/1
100 TABLET ORAL EVERY MORNING
COMMUNITY

## 2020-03-25 RX ORDER — TRAZODONE HYDROCHLORIDE 50 MG/1
50 TABLET ORAL NIGHTLY
Status: DISCONTINUED | OUTPATIENT
Start: 2020-03-26 | End: 2020-03-27

## 2020-03-25 RX ORDER — LOPERAMIDE HYDROCHLORIDE 2 MG/1
2 CAPSULE ORAL
Status: DISCONTINUED | OUTPATIENT
Start: 2020-03-25 | End: 2020-04-02 | Stop reason: HOSPADM

## 2020-03-25 RX ORDER — ALUMINA, MAGNESIA, AND SIMETHICONE 2400; 2400; 240 MG/30ML; MG/30ML; MG/30ML
15 SUSPENSION ORAL EVERY 6 HOURS PRN
Status: DISCONTINUED | OUTPATIENT
Start: 2020-03-25 | End: 2020-04-02 | Stop reason: HOSPADM

## 2020-03-25 RX ORDER — ACETAMINOPHEN 325 MG/1
650 TABLET ORAL EVERY 4 HOURS PRN
Status: DISCONTINUED | OUTPATIENT
Start: 2020-03-25 | End: 2020-04-02 | Stop reason: HOSPADM

## 2020-03-25 RX ORDER — LORAZEPAM 1 MG/1
2 TABLET ORAL EVERY 6 HOURS PRN
Status: DISCONTINUED | OUTPATIENT
Start: 2020-03-25 | End: 2020-03-26

## 2020-03-25 RX ADMIN — LORAZEPAM 2 MG: 1 TABLET ORAL at 23:41

## 2020-03-25 RX ADMIN — TRAZODONE HYDROCHLORIDE 50 MG: 50 TABLET ORAL at 23:41

## 2020-03-26 LAB
ALBUMIN SERPL-MCNC: 4.1 G/DL (ref 3.5–5.2)
ALBUMIN/GLOB SERPL: 1.6 G/DL
ALP SERPL-CCNC: 79 U/L (ref 39–117)
ALT SERPL W P-5'-P-CCNC: 21 U/L (ref 1–41)
ANION GAP SERPL CALCULATED.3IONS-SCNC: 14.3 MMOL/L (ref 5–15)
AST SERPL-CCNC: 13 U/L (ref 1–40)
BASOPHILS # BLD AUTO: 0.05 10*3/MM3 (ref 0–0.2)
BASOPHILS NFR BLD AUTO: 0.8 % (ref 0–1.5)
BILIRUB SERPL-MCNC: 0.5 MG/DL (ref 0.2–1.2)
BILIRUB UR QL STRIP: NEGATIVE
BUN BLD-MCNC: 13 MG/DL (ref 6–20)
BUN/CREAT SERPL: 12 (ref 7–25)
CALCIUM SPEC-SCNC: 9 MG/DL (ref 8.6–10.5)
CHLORIDE SERPL-SCNC: 104 MMOL/L (ref 98–107)
CLARITY UR: ABNORMAL
CO2 SERPL-SCNC: 21.7 MMOL/L (ref 22–29)
COLOR UR: YELLOW
CREAT BLD-MCNC: 1.08 MG/DL (ref 0.76–1.27)
DEPRECATED RDW RBC AUTO: 40.8 FL (ref 37–54)
EOSINOPHIL # BLD AUTO: 0.08 10*3/MM3 (ref 0–0.4)
EOSINOPHIL NFR BLD AUTO: 1.3 % (ref 0.3–6.2)
ERYTHROCYTE [DISTWIDTH] IN BLOOD BY AUTOMATED COUNT: 13 % (ref 12.3–15.4)
GFR SERPL CREATININE-BSD FRML MDRD: 71 ML/MIN/1.73
GLOBULIN UR ELPH-MCNC: 2.6 GM/DL
GLUCOSE BLD-MCNC: 97 MG/DL (ref 65–99)
GLUCOSE UR STRIP-MCNC: NEGATIVE MG/DL
HCT VFR BLD AUTO: 43.3 % (ref 37.5–51)
HGB BLD-MCNC: 14.9 G/DL (ref 13–17.7)
HGB UR QL STRIP.AUTO: NEGATIVE
IMM GRANULOCYTES # BLD AUTO: 0.04 10*3/MM3 (ref 0–0.05)
IMM GRANULOCYTES NFR BLD AUTO: 0.7 % (ref 0–0.5)
KETONES UR QL STRIP: NEGATIVE
LEUKOCYTE ESTERASE UR QL STRIP.AUTO: NEGATIVE
LYMPHOCYTES # BLD AUTO: 1.3 10*3/MM3 (ref 0.7–3.1)
LYMPHOCYTES NFR BLD AUTO: 21.6 % (ref 19.6–45.3)
MCH RBC QN AUTO: 29.9 PG (ref 26.6–33)
MCHC RBC AUTO-ENTMCNC: 34.4 G/DL (ref 31.5–35.7)
MCV RBC AUTO: 86.9 FL (ref 79–97)
MONOCYTES # BLD AUTO: 0.64 10*3/MM3 (ref 0.1–0.9)
MONOCYTES NFR BLD AUTO: 10.6 % (ref 5–12)
NEUTROPHILS # BLD AUTO: 3.91 10*3/MM3 (ref 1.7–7)
NEUTROPHILS NFR BLD AUTO: 65 % (ref 42.7–76)
NITRITE UR QL STRIP: NEGATIVE
NRBC BLD AUTO-RTO: 0 /100 WBC (ref 0–0.2)
PH UR STRIP.AUTO: 8 [PH] (ref 5–8)
PLATELET # BLD AUTO: 296 10*3/MM3 (ref 140–450)
PMV BLD AUTO: 8.5 FL (ref 6–12)
POTASSIUM BLD-SCNC: 4.1 MMOL/L (ref 3.5–5.2)
PROT SERPL-MCNC: 6.7 G/DL (ref 6–8.5)
PROT UR QL STRIP: NEGATIVE
RBC # BLD AUTO: 4.98 10*6/MM3 (ref 4.14–5.8)
SODIUM BLD-SCNC: 140 MMOL/L (ref 136–145)
SP GR UR STRIP: 1.02 (ref 1–1.03)
T4 FREE SERPL-MCNC: 1.58 NG/DL (ref 0.93–1.7)
TSH SERPL DL<=0.05 MIU/L-ACNC: 2.84 UIU/ML (ref 0.27–4.2)
UROBILINOGEN UR QL STRIP: ABNORMAL
WBC NRBC COR # BLD: 6.02 10*3/MM3 (ref 3.4–10.8)

## 2020-03-26 PROCEDURE — 81003 URINALYSIS AUTO W/O SCOPE: CPT | Performed by: INTERNAL MEDICINE

## 2020-03-26 PROCEDURE — 85025 COMPLETE CBC W/AUTO DIFF WBC: CPT | Performed by: SPECIALIST

## 2020-03-26 RX ORDER — LORAZEPAM 1 MG/1
2 TABLET ORAL EVERY 6 HOURS PRN
Status: DISCONTINUED | OUTPATIENT
Start: 2020-03-26 | End: 2020-03-28

## 2020-03-26 RX ORDER — ESCITALOPRAM OXALATE 10 MG/1
10 TABLET ORAL NIGHTLY
Status: DISCONTINUED | OUTPATIENT
Start: 2020-03-26 | End: 2020-03-30

## 2020-03-26 RX ORDER — AMLODIPINE BESYLATE 2.5 MG/1
2.5 TABLET ORAL DAILY
Status: ON HOLD | COMMUNITY
End: 2020-03-26

## 2020-03-26 RX ORDER — TAMSULOSIN HYDROCHLORIDE 0.4 MG/1
1 CAPSULE ORAL
Status: ON HOLD | COMMUNITY
End: 2020-03-26

## 2020-03-26 RX ORDER — LOSARTAN POTASSIUM 100 MG/1
100 TABLET ORAL
Status: DISCONTINUED | OUTPATIENT
Start: 2020-03-26 | End: 2020-04-02 | Stop reason: HOSPADM

## 2020-03-26 RX ORDER — LORAZEPAM 1 MG/1
2 TABLET ORAL
Status: DISCONTINUED | OUTPATIENT
Start: 2020-03-26 | End: 2020-03-26

## 2020-03-26 RX ORDER — LANSOPRAZOLE
30 KIT EVERY MORNING
Status: DISCONTINUED | OUTPATIENT
Start: 2020-03-26 | End: 2020-04-02 | Stop reason: HOSPADM

## 2020-03-26 RX ADMIN — LOSARTAN POTASSIUM 100 MG: 100 TABLET, FILM COATED ORAL at 08:08

## 2020-03-26 RX ADMIN — ACETAMINOPHEN 650 MG: 325 TABLET, FILM COATED ORAL at 08:20

## 2020-03-26 RX ADMIN — TRAZODONE HYDROCHLORIDE 50 MG: 50 TABLET ORAL at 21:18

## 2020-03-26 RX ADMIN — ESCITALOPRAM 10 MG: 10 TABLET, FILM COATED ORAL at 21:18

## 2020-03-26 RX ADMIN — LORAZEPAM 2 MG: 1 TABLET ORAL at 14:05

## 2020-03-26 RX ADMIN — LANSOPRAZOLE 30 MG: KIT at 07:46

## 2020-03-26 RX ADMIN — LORAZEPAM 2 MG: 1 TABLET ORAL at 08:08

## 2020-03-27 RX ORDER — ZOLPIDEM TARTRATE 5 MG/1
10 TABLET ORAL NIGHTLY PRN
Status: DISCONTINUED | OUTPATIENT
Start: 2020-03-27 | End: 2020-04-02 | Stop reason: HOSPADM

## 2020-03-27 RX ADMIN — ESCITALOPRAM 10 MG: 10 TABLET, FILM COATED ORAL at 21:14

## 2020-03-27 RX ADMIN — ZOLPIDEM TARTRATE 10 MG: 5 TABLET ORAL at 21:17

## 2020-03-27 RX ADMIN — LORAZEPAM 2 MG: 1 TABLET ORAL at 22:47

## 2020-03-27 RX ADMIN — LORAZEPAM 2 MG: 1 TABLET ORAL at 17:01

## 2020-03-27 RX ADMIN — LORAZEPAM 2 MG: 1 TABLET ORAL at 08:55

## 2020-03-27 RX ADMIN — LOSARTAN POTASSIUM 100 MG: 100 TABLET, FILM COATED ORAL at 08:55

## 2020-03-27 RX ADMIN — LANSOPRAZOLE 30 MG: KIT at 07:55

## 2020-03-28 RX ORDER — CLONAZEPAM 1 MG/1
2 TABLET ORAL EVERY 6 HOURS PRN
Status: DISCONTINUED | OUTPATIENT
Start: 2020-03-28 | End: 2020-03-31

## 2020-03-28 RX ADMIN — ZOLPIDEM TARTRATE 10 MG: 5 TABLET ORAL at 20:45

## 2020-03-28 RX ADMIN — LOSARTAN POTASSIUM 100 MG: 100 TABLET, FILM COATED ORAL at 08:31

## 2020-03-28 RX ADMIN — LORAZEPAM 2 MG: 1 TABLET ORAL at 09:40

## 2020-03-28 RX ADMIN — ESCITALOPRAM 10 MG: 10 TABLET, FILM COATED ORAL at 20:38

## 2020-03-28 RX ADMIN — LANSOPRAZOLE 30 MG: KIT at 08:31

## 2020-03-28 RX ADMIN — CLONAZEPAM 2 MG: 1 TABLET ORAL at 16:15

## 2020-03-29 RX ADMIN — LOSARTAN POTASSIUM 100 MG: 100 TABLET, FILM COATED ORAL at 07:50

## 2020-03-29 RX ADMIN — CLONAZEPAM 2 MG: 1 TABLET ORAL at 15:23

## 2020-03-29 RX ADMIN — ZOLPIDEM TARTRATE 10 MG: 5 TABLET ORAL at 21:40

## 2020-03-29 RX ADMIN — CLONAZEPAM 2 MG: 1 TABLET ORAL at 07:51

## 2020-03-29 RX ADMIN — ESCITALOPRAM 10 MG: 10 TABLET, FILM COATED ORAL at 21:41

## 2020-03-29 RX ADMIN — LANSOPRAZOLE 30 MG: KIT at 07:49

## 2020-03-30 RX ORDER — ESCITALOPRAM OXALATE 20 MG/1
20 TABLET ORAL NIGHTLY
Status: DISCONTINUED | OUTPATIENT
Start: 2020-03-30 | End: 2020-04-02 | Stop reason: HOSPADM

## 2020-03-30 RX ADMIN — LANSOPRAZOLE 30 MG: KIT at 08:04

## 2020-03-30 RX ADMIN — ZOLPIDEM TARTRATE 10 MG: 5 TABLET ORAL at 21:42

## 2020-03-30 RX ADMIN — CLONAZEPAM 2 MG: 1 TABLET ORAL at 08:05

## 2020-03-30 RX ADMIN — ESCITALOPRAM 20 MG: 20 TABLET, FILM COATED ORAL at 21:42

## 2020-03-30 RX ADMIN — LOSARTAN POTASSIUM 100 MG: 100 TABLET, FILM COATED ORAL at 08:59

## 2020-03-31 RX ORDER — CLONAZEPAM 1 MG/1
1 TABLET ORAL 4 TIMES DAILY PRN
Status: DISCONTINUED | OUTPATIENT
Start: 2020-03-31 | End: 2020-04-01

## 2020-03-31 RX ADMIN — ZOLPIDEM TARTRATE 10 MG: 5 TABLET ORAL at 21:25

## 2020-03-31 RX ADMIN — LANSOPRAZOLE 30 MG: KIT at 09:45

## 2020-03-31 RX ADMIN — ESCITALOPRAM 20 MG: 20 TABLET, FILM COATED ORAL at 21:09

## 2020-03-31 RX ADMIN — CLONAZEPAM 1 MG: 1 TABLET ORAL at 21:17

## 2020-03-31 RX ADMIN — CLONAZEPAM 1 MG: 1 TABLET ORAL at 16:29

## 2020-03-31 RX ADMIN — CLONAZEPAM 2 MG: 1 TABLET ORAL at 03:02

## 2020-03-31 RX ADMIN — LOSARTAN POTASSIUM 100 MG: 100 TABLET, FILM COATED ORAL at 09:44

## 2020-04-01 RX ORDER — CLONAZEPAM 1 MG/1
1 TABLET ORAL 4 TIMES DAILY PRN
Status: DISCONTINUED | OUTPATIENT
Start: 2020-04-01 | End: 2020-04-02 | Stop reason: HOSPADM

## 2020-04-01 RX ADMIN — ZOLPIDEM TARTRATE 10 MG: 5 TABLET ORAL at 20:44

## 2020-04-01 RX ADMIN — CLONAZEPAM 1 MG: 1 TABLET ORAL at 05:57

## 2020-04-01 RX ADMIN — CLONAZEPAM 1 MG: 1 TABLET ORAL at 20:44

## 2020-04-01 RX ADMIN — LANSOPRAZOLE 30 MG: KIT at 08:27

## 2020-04-01 RX ADMIN — LOSARTAN POTASSIUM 100 MG: 100 TABLET, FILM COATED ORAL at 08:27

## 2020-04-01 RX ADMIN — ESCITALOPRAM 20 MG: 20 TABLET, FILM COATED ORAL at 20:44

## 2020-04-01 RX ADMIN — CLONAZEPAM 1 MG: 1 TABLET ORAL at 11:11

## 2020-04-01 NOTE — PLAN OF CARE
Pt present out in milieu, engages well with others. Cooperative and compliant with medications. Pt requested PRN Anxiety medication, did not rate anxiety. Pt denied thoughts to hurt self or others. Pt did ask a couple of questions, not as many as previously noted. Will continue to provide feedback and support.    Problem: Patient Care Overview  Goal: Plan of Care Review  Outcome: Ongoing (interventions implemented as appropriate)  Flowsheets (Taken 4/1/2020 0344)  Progress: no change  Plan of Care Reviewed With: patient  Patient Agreement with Plan of Care: agrees  Goal: Individualization and Mutuality  Outcome: Ongoing (interventions implemented as appropriate)  Goal: Discharge Needs Assessment  Outcome: Ongoing (interventions implemented as appropriate)  Goal: Interprofessional Rounds/Family Conf  Outcome: Ongoing (interventions implemented as appropriate)     Problem: Overarching Goals (Adult)  Goal: Adheres to Safety Considerations for Self and Others  Outcome: Ongoing (interventions implemented as appropriate)  Flowsheets (Taken 4/1/2020 0344)  Adheres to Safety Considerations for Self and Others: making progress toward outcome  Goal: Optimized Coping Skills in Response to Life Stressors  Outcome: Ongoing (interventions implemented as appropriate)  Flowsheets (Taken 4/1/2020 0344)  Optimized Coping Skills in Response to Life Stressors: making progress toward outcome  Goal: Develops/Participates in Therapeutic [place] to Support Successful Transition  Outcome: Ongoing (interventions implemented as appropriate)  Flowsheets (Taken 4/1/2020 0344)  Develops/Participates in Therapeutic [place] to Support Successful Transition: making progress toward outcome     Problem: Suicidal Behavior (Adult)  Goal: Suicidal Behavior is Absent/Minimized/Managed  Outcome: Ongoing (interventions implemented as appropriate)  Flowsheets (Taken 4/1/2020 0344)  Suicidal Behavior Managed/Minimized Action Step (STG) Outcome: making  progress toward outcome

## 2020-04-01 NOTE — PLAN OF CARE
Patient has been cooperative; taking his medications and going to groups. For this nurse today (like yesterday) he was unable to rate anxiety and seemed calm, but requested his prn klonopin with his morning medications. He denies SI, HI, depression and anxiety and was cleared by Dr. Schreiber today, however, discharge will be delayed until tomorrow after a family phone session with wife Aysha. I spoke with her at length last night about her concern for and frustration with .  She says he has been very vague in his explanation of anxiety and hospitalization.  She has been considerably stressed out and overwhelmed since pt quit his job over a year ago and hopes this hospitalization will provide them both with answers and a potential solution.  Problem: Patient Care Overview  Goal: Plan of Care Review  Outcome: Ongoing (interventions implemented as appropriate)  Goal: Individualization and Mutuality  Outcome: Ongoing (interventions implemented as appropriate)  Goal: Discharge Needs Assessment  Outcome: Ongoing (interventions implemented as appropriate)  Goal: Interprofessional Rounds/Family Conf  Outcome: Ongoing (interventions implemented as appropriate)     Problem: Overarching Goals (Adult)  Goal: Adheres to Safety Considerations for Self and Others  Outcome: Ongoing (interventions implemented as appropriate)  Goal: Optimized Coping Skills in Response to Life Stressors  Outcome: Ongoing (interventions implemented as appropriate)  Goal: Develops/Participates in Therapeutic [place] to Support Successful Transition  Outcome: Ongoing (interventions implemented as appropriate)     Problem: Suicidal Behavior (Adult)  Goal: Suicidal Behavior is Absent/Minimized/Managed  Outcome: Ongoing (interventions implemented as appropriate)

## 2020-04-01 NOTE — PROGRESS NOTES
Patient is seen, evaluated, and chart reviewed. Discussed with staff.  Patient is seen for Dr. Mccann.    Staff reports that patient has been cooperative and compliant with medications.  No behavioral issues.  He has been attending groups.    On examination, patient is found in group.  Patient slept okay last night.  Mood is less depressed and anxious.  Affect congruent.  No SI/HI/AVH.  Thought processes are more goal-directed.  Judgment and insight are okay.    No medication side effects.  Patient is provided with supportive therapy.  I reviewed the case with the patient's wife.  She requests a family session with therapist tomorrow.  If this goes well, possible discharge tomorrow.  Continue current medications, therapy, and inpatient treatment plan for safety and stabilization.

## 2020-04-02 VITALS
OXYGEN SATURATION: 97 % | DIASTOLIC BLOOD PRESSURE: 96 MMHG | HEART RATE: 83 BPM | SYSTOLIC BLOOD PRESSURE: 147 MMHG | TEMPERATURE: 98.2 F | RESPIRATION RATE: 18 BRPM

## 2020-04-02 RX ORDER — ESCITALOPRAM OXALATE 20 MG/1
20 TABLET ORAL NIGHTLY
Qty: 30 TABLET | Refills: 1 | Status: SHIPPED | OUTPATIENT
Start: 2020-04-02

## 2020-04-02 RX ORDER — ZOLPIDEM TARTRATE 10 MG/1
10 TABLET ORAL NIGHTLY PRN
Qty: 30 TABLET | Refills: 0 | Status: SHIPPED | OUTPATIENT
Start: 2020-04-02 | End: 2020-04-06

## 2020-04-02 RX ORDER — CLONAZEPAM 1 MG/1
1 TABLET ORAL 4 TIMES DAILY PRN
Qty: 90 TABLET | Refills: 0 | Status: SHIPPED | OUTPATIENT
Start: 2020-04-02 | End: 2020-04-11

## 2020-04-02 RX ADMIN — CLONAZEPAM 1 MG: 1 TABLET ORAL at 11:15

## 2020-04-02 RX ADMIN — LOSARTAN POTASSIUM 100 MG: 100 TABLET, FILM COATED ORAL at 08:27

## 2020-04-02 RX ADMIN — CLONAZEPAM 1 MG: 1 TABLET ORAL at 03:51

## 2020-04-02 NOTE — PROGRESS NOTES
Patient is seen, evaluated, and chart reviewed. Discussed with staff.  Patient is seen for Dr. Mccann.    Staff reports that patient has been cooperative and compliant with medications.  No behavioral issues.    On examination, patient is found in his room.  Patient slept fair last night.  He reports he had a productive family session with his wife.  Mood is a little anxious.  Affect congruent.  No SI/HI/AVH.  Thought processes are loosely goal-directed.  Judgment and insight are okay.    No medication side effects.  Patient is provided with supportive therapy.  Patient will discharge home today and follow-up with community mental health services.

## 2020-04-02 NOTE — PROGRESS NOTES
Continued Stay Note  [facility]     Patient Name: Walter Fish  MRN: [id number]  Today's Date: 4/2/2020    Admit Date: 3/25/2020    Discharge Plan     Row Name 04/02/20 1416       Plan    Plan Comments  SW met w/pt to discuss follow-up care.  SW informed pt that she scheduled an outpt mental health therapist appt.  SW inquired as to whether pt wanted a Psychiatrist or APRN for medication management; pt stated that his PCP would prescribe his medications.        Discharge Codes    No documentation.             JJ Calvo

## 2020-04-02 NOTE — PLAN OF CARE
"  Problem: Patient Care Overview  Goal: Plan of Care Review  Outcome: Ongoing (interventions implemented as appropriate)  Flowsheets  Taken 4/2/2020 0411  Progress: no change  Taken 4/1/2020 2230  Plan of Care Reviewed With: patient  Patient Agreement with Plan of Care: agrees  Note:   Pt remains preoccupied with his medication. Pt continuously asked staff for his PRN Klonopin. This nurse told pt multiple times that the Klonopin was not due until several hours later. Pt did not want to take PRN Ambien because he said it causes him to \"have dreams\". Voiced anxiety, but did not rate. Denied depression, SI/HI, and hallucinations. Will continue to monitor and assess.    Goal: Individualization and Mutuality  Outcome: Ongoing (interventions implemented as appropriate)  Goal: Discharge Needs Assessment  Outcome: Ongoing (interventions implemented as appropriate)  Goal: Interprofessional Rounds/Family Conf  Outcome: Ongoing (interventions implemented as appropriate)     Problem: Overarching Goals (Adult)  Goal: Adheres to Safety Considerations for Self and Others  Outcome: Ongoing (interventions implemented as appropriate)  Flowsheets (Taken 4/2/2020 0411)  Adheres to Safety Considerations for Self and Others: making progress toward outcome  Goal: Optimized Coping Skills in Response to Life Stressors  Outcome: Ongoing (interventions implemented as appropriate)  Flowsheets (Taken 4/2/2020 0411)  Optimized Coping Skills in Response to Life Stressors: making progress toward outcome  Goal: Develops/Participates in Therapeutic [place] to Support Successful Transition  Outcome: Ongoing (interventions implemented as appropriate)  Flowsheets (Taken 4/2/2020 0411)  Develops/Participates in Therapeutic [place] to Support Successful Transition: making progress toward outcome     Problem: Suicidal Behavior (Adult)  Goal: Suicidal Behavior is Absent/Minimized/Managed  Outcome: Ongoing (interventions implemented as " appropriate)  Flowsheets (Taken 4/2/2020 0411)  Suicidal Behavior Managed/Minimized Time Frame for Action Step (STG): 1 day  Suicidal Behavior Managed/Minimized Action Step (STG) Outcome: making progress toward outcome

## 2020-04-06 NOTE — PROGRESS NOTES
Continued Stay Note  [facility]     Patient Name: Walter Fish  MRN: [id number]  Today's Date: 4/6/2020    Admit Date: 3/25/2020    Discharge Plan     Row Name 04/06/20 1243       Plan    Plan Comments  SW rec'd a phone call from pt's spouse, Aysha Fish stating that pt did not return home once he was discharged from the hospital.  Mrs. Fish stated that she filed a [place] Alert for pt but has not heard any information.  Mrs. Fish asked did pt express suicidal ideation when he left the hospital; KEN informed her that according to the family therapy session, Doctor's note, and most recent Nurse's note, no suicidal ideation was expressed.  Pt's spouse stated that she spoke w/pt's brother & cousin and was informed that pt went by his cousin's house to  his guns the day he was d/c'd.  She also stated that pt's brother spoke w/him that day in which pt expressed being foggy.  Mrs. Fish stated that she contacted the pharmacy and pt picked up his medication that day.  She expressed being very nervous and was just trying to get any information that would assist  her.  KEN adv that pt was scheduled for outpt mental health therapy this Friday, 4/10 at [facility] at 3 pm.  KEN also adv that pt stated that he would receive his medication ongoing from his PCP.        Discharge Codes    No documentation.       Expected Discharge Date and Time     Expected Discharge Date Expected Discharge Time    Apr 2, 2020             JJ Calvo

## 2020-04-06 NOTE — DISCHARGE SUMMARY
DATES OF ADMISSION: 3/25/2020-4/2/2020    REASON FOR ADMISSION: The patient is a 55-year-old white male admitted with increasing suicidal ideation and depression    LABS: See chart    HOSPITAL COURSE: Patient was mated to the crisis management unit and placed on suicide precautions.  Trial of Lexapro was initiated and PRN lorazepam was continued and trazodone was added for sleep.  Patient continued to complain of some anxiety.  But otherwise was pleasant and cooperative interactions with peers and staff.  A positive family therapy session took place on the date of discharge 4/2/2020.  At that time the patient denied suicidal action and had made certain that all firearms had been removed from the home.  As per his request discharge was ordered by Dr. Schreiber.    FINAL DIAGNOSIS: Major depressive disorder single episode moderate, history of prostate cancer GERD    DISPOSITION ON DISCHARGE: A full listing of the patient's medications is provided below.  Follow-up will take place with community mental health resources    PROGNOSIS: Fair       Discharge Medications      New Medications      Instructions Start Date   clonazePAM 1 MG tablet  Commonly known as:  KlonoPIN   1 mg, Oral, 4 Times Daily PRN      escitalopram 20 MG tablet  Commonly known as:  LEXAPRO   20 mg, Oral, Nightly      zolpidem 10 MG tablet  Commonly known as:  AMBIEN   10 mg, Oral, Nightly PRN         Continue These Medications      Instructions Start Date   lansoprazole 30 MG capsule  Commonly known as:  PREVACID   TAKE 1 CAPSULE BY MOUTH EVERY DAY      losartan 100 MG tablet  Commonly known as:  COZAAR   100 mg, Oral, Every Morning         Stop These Medications    LORazepam 2 MG tablet  Commonly known as:  ATIVAN

## 2020-04-08 ENCOUNTER — TELEPHONE (OUTPATIENT)
Dept: PSYCHIATRY | Facility: HOSPITAL | Age: 55
End: 2020-04-08

## 2020-06-02 RX ORDER — AMLODIPINE BESYLATE 2.5 MG/1
TABLET ORAL
Qty: 90 TABLET | Refills: 3 | Status: SHIPPED | OUTPATIENT
Start: 2020-06-02

## 2021-03-24 ENCOUNTER — BULK ORDERING (OUTPATIENT)
Dept: CASE MANAGEMENT | Facility: OTHER | Age: 56
End: 2021-03-24

## 2021-03-24 DIAGNOSIS — Z23 IMMUNIZATION DUE: ICD-10-CM

## 2023-12-11 NOTE — SIGNIFICANT NOTE
"Session with pt and spouse via conference call.  Spouse expressed questions about pt's safety for d/c today and whether or not he is a danger to himself or others.  Pt denies current SI and states that he has never had thoughts about hurting others.  Wife expressed anger that pt confided in others (his cousin and brother) about his SI but did not talk to her about it.  Pt disagreed and reports he did text his wife and tell her why he was going to the emergency room.  Wife also reports being angry that pt had asked his cousin to remove the guns from his home, wife was unaware of this when pt's cousin arrived and removed all of their guns.  Pt reports he understands his wife's anger but that he in his mind he was protecting his wife by confiding in others because he did not want her to worry.  Pt recognizes this was not how it was perceived, but tried to explain to his wife that he himself did not fully understand what he was going through.  Wife reports she does not trust pt and is not sure if she will again.  Encouraged open communication as a step to rebuilding trust, and also highlighted that wife may need to be open to listening without judging or trying to \"fix\" pt so that he feels safer to confide in her.  Also encouraged ongoing marital counseling to work on communication and healing.  Wife reports \"I don't believe in counseling\" and states she would not participate.  Both pt and wife agreed they have lived separate lives for many years.  Wife states she wants pt to take things one day at a time, and hinted that she is not sure she wants to continue with the marriage.  Pt was repetitive throughout session, ruminating on many difficult things that occurred over the past year.  Wife expressed desire to end session, and understands pt will likely be discharged home today.  Pt reports he still feels ready for d/c and has no SI.    "
35

## (undated) DEVICE — APPL CHLORAPREP W/TINT 26ML ORNG

## (undated) DEVICE — DRSNG WND BORDR/ADHS NONADHR/GZ LF 2X2IN STRL

## (undated) DEVICE — OSC GEN LAPAROSCOPY: Brand: MEDLINE INDUSTRIES, INC.

## (undated) DEVICE — UNDYED BRAIDED (POLYGLACTIN 910), SYNTHETIC ABSORBABLE SUTURE: Brand: COATED VICRYL

## (undated) DEVICE — 2, DISPOSABLE SUCTION/IRRIGATOR WITH DISPOSABLE TIP: Brand: STRYKEFLOW

## (undated) DEVICE — GLV SURG BIOGEL LTX PF 7

## (undated) DEVICE — 3M™ STERI-STRIP™ COMPOUND BENZOIN TINCTURE 40 BAGS/CARTON 4 CARTONS/CASE C1544: Brand: 3M™ STERI-STRIP™

## (undated) DEVICE — SYR LUERLOK 30CC

## (undated) DEVICE — ENDOPATH XCEL BLADELESS TROCARS WITH STABILITY SLEEVES: Brand: ENDOPATH XCEL